# Patient Record
Sex: FEMALE | NOT HISPANIC OR LATINO | Employment: UNEMPLOYED
[De-identification: names, ages, dates, MRNs, and addresses within clinical notes are randomized per-mention and may not be internally consistent; named-entity substitution may affect disease eponyms.]

---

## 2017-08-19 ENCOUNTER — HEALTH MAINTENANCE LETTER (OUTPATIENT)
Age: 37
End: 2017-08-19

## 2019-12-03 ASSESSMENT — MIFFLIN-ST. JEOR: SCORE: 1902.59

## 2019-12-06 ENCOUNTER — ANESTHESIA - HEALTHEAST (OUTPATIENT)
Dept: SURGERY | Facility: AMBULATORY SURGERY CENTER | Age: 39
End: 2019-12-06

## 2019-12-09 ENCOUNTER — SURGERY - HEALTHEAST (OUTPATIENT)
Dept: SURGERY | Facility: AMBULATORY SURGERY CENTER | Age: 39
End: 2019-12-09

## 2019-12-09 ASSESSMENT — MIFFLIN-ST. JEOR: SCORE: 1902.59

## 2020-03-12 ASSESSMENT — MIFFLIN-ST. JEOR: SCORE: 1830.02

## 2020-03-13 ENCOUNTER — ANESTHESIA - HEALTHEAST (OUTPATIENT)
Dept: SURGERY | Facility: AMBULATORY SURGERY CENTER | Age: 40
End: 2020-03-13

## 2020-03-16 ENCOUNTER — SURGERY - HEALTHEAST (OUTPATIENT)
Dept: SURGERY | Facility: AMBULATORY SURGERY CENTER | Age: 40
End: 2020-03-16

## 2020-03-16 ASSESSMENT — MIFFLIN-ST. JEOR: SCORE: 1831.16

## 2021-06-03 VITALS — WEIGHT: 265 LBS | BODY MASS INDEX: 40.16 KG/M2 | HEIGHT: 68 IN

## 2021-06-04 VITALS — WEIGHT: 251 LBS | HEIGHT: 67 IN | BODY MASS INDEX: 39.39 KG/M2

## 2021-06-04 NOTE — ANESTHESIA PREPROCEDURE EVALUATION
Anesthesia Evaluation      Patient summary reviewed   History of anesthetic complications (Delayed emergence)     Airway   Mallampati: II  Neck ROM: full   Pulmonary     breath sounds clear to auscultation  (+) asthma   well controlled,                          Cardiovascular - negative ROS  Rhythm: regular  Rate: normal,         Neuro/Psych      Comments: Idiopathic hypersomnia    Endo/Other    (+) obesity (Morbid obesity, BMI >40),      GI/Hepatic/Renal - negative ROS           Dental - normal exam                        Anesthesia Plan  Planned anesthetic: general endotracheal    ASA 3   Induction: intravenous   Anesthetic plan and risks discussed with: patient and spouse  Anesthesia plan special considerations: antiemetics,   Post-op plan: routine recovery

## 2021-06-04 NOTE — ANESTHESIA CARE TRANSFER NOTE
Last vitals:   Vitals:    12/09/19 0949   BP: 133/63   Pulse: 91   Resp: 16   Temp: 36.3  C (97.3  F)   SpO2: 100%     Patient's level of consciousness is drowsy  Spontaneous respirations: yes  Maintains airway independently: yes  Dentition unchanged: yes  Oropharynx: oropharynx clear of all foreign objects    QCDR Measures:  ASA# 20 - Surgical Safety Checklist: WHO surgical safety checklist completed prior to induction    PQRS# 430 - Adult PONV Prevention: 4558F - Pt received => 2 anti-emetic agents (different classes) preop & intraop  ASA# 8 - Peds PONV Prevention: NA - Not pediatric patient, not GA or 2 or more risk factors NOT present  PQRS# 424 - Rebecca-op Temp Management: 4559F - At least one body temp DOCUMENTED => 35.5C or 95.9F within required timeframe  PQRS# 426 - PACU Transfer Protocol: - Transfer of care checklist used  ASA# 14 - Acute Post-op Pain: ASA14B - Patient did NOT experience pain >= 7 out of 10     Volatile agents turned off, muscle relaxant reversed, 4/4 twitches with sustained tetany. Pt breathing spontaneously with adequate tidal volumes, following commands, gently suctioned oropharynx, extubated without issue. 10LPM O2 applied via face mask.Transported by CRNA to recovery.

## 2021-06-04 NOTE — ANESTHESIA POSTPROCEDURE EVALUATION
Patient: Melissa Jay  BILATERAL ARM LIPOSUCTION  Anesthesia type: general    Patient location: Phase II Recovery  Last vitals:   Vitals Value Taken Time   /65 12/9/2019 10:30 AM   Temp 36.7  C (98.1  F) 12/9/2019 10:23 AM   Pulse 79 12/9/2019 10:33 AM   Resp 16 12/9/2019 10:23 AM   SpO2 94 % 12/9/2019 10:33 AM   Vitals shown include unvalidated device data.  Post vital signs: stable  Level of consciousness: awake and responds to simple questions  Post-anesthesia pain: pain controlled  Post-anesthesia nausea and vomiting: no  Pulmonary: unassisted, return to baseline  Cardiovascular: stable and blood pressure at baseline  Hydration: adequate  Anesthetic events: no    QCDR Measures:  ASA# 11 - Rebecca-op Cardiac Arrest: ASA11B - Patient did NOT experience unanticipated cardiac arrest  ASA# 12 - Rebecca-op Mortality Rate: ASA12B - Patient did NOT die  ASA# 13 - PACU Re-Intubation Rate: ASA13B - Patient did NOT require a new airway mgmt  ASA# 10 - Composite Anes Safety: ASA10A - No serious adverse event    Additional Notes:

## 2021-06-06 NOTE — ANESTHESIA CARE TRANSFER NOTE
Last vitals:   Vitals:    03/16/20 1352   BP: 122/76   Pulse: 93   Resp: 16   Temp: 36.1  C (96.9  F)   SpO2: 100%     Patient's level of consciousness is drowsy  Spontaneous respirations: yes  Maintains airway independently: yes  Dentition unchanged: yes  Oropharynx: oropharynx clear of all foreign objects    QCDR Measures:  ASA# 20 - Surgical Safety Checklist: WHO surgical safety checklist completed prior to induction    PQRS# 430 - Adult PONV Prevention: 4558F - Pt received => 2 anti-emetic agents (different classes) preop & intraop  ASA# 8 - Peds PONV Prevention: NA - Not pediatric patient, not GA or 2 or more risk factors NOT present  PQRS# 424 - Rebecca-op Temp Management: 4559F - At least one body temp DOCUMENTED => 35.5C or 95.9F within required timeframe  PQRS# 426 - PACU Transfer Protocol: - Transfer of care checklist used  ASA# 14 - Acute Post-op Pain: ASA14B - Patient did NOT experience pain >= 7 out of 10

## 2021-06-06 NOTE — ANESTHESIA POSTPROCEDURE EVALUATION
Patient: Melissa Jay  Procedure(s):  ABDOMINOPLASTY, BILATERAL BREAST REDUCTION  BILATERAL BREAST REDUCTION (Bilateral)  Anesthesia type: general    Patient location: PACU  Last vitals:   Vitals Value Taken Time   /68 3/16/2020  2:15 PM   Temp 36.2  C (97.1  F) 3/16/2020  2:01 PM   Pulse 72 3/16/2020  2:25 PM   Resp 16 3/16/2020  2:15 PM   SpO2 100 % 3/16/2020  2:25 PM   Vitals shown include unvalidated device data.  Post vital signs: stable  Level of consciousness: awake and responds to simple questions  Post-anesthesia pain: pain controlled  Post-anesthesia nausea and vomiting: no  Pulmonary: unassisted, return to baseline  Cardiovascular: stable and blood pressure at baseline  Hydration: adequate  Anesthetic events: no    QCDR Measures:  ASA# 11 - Rebecca-op Cardiac Arrest: ASA11B - Patient did NOT experience unanticipated cardiac arrest  ASA# 12 - Rebecca-op Mortality Rate: ASA12B - Patient did NOT die  ASA# 13 - PACU Re-Intubation Rate: ASA13B - Patient did NOT require a new airway mgmt  ASA# 10 - Composite Anes Safety: ASA10A - No serious adverse event    Additional Notes:

## 2021-06-06 NOTE — ANESTHESIA PREPROCEDURE EVALUATION
Anesthesia Evaluation        Airway   Mallampati: II  Neck ROM: full   Pulmonary - normal exam   (+) asthma                           Cardiovascular - negative ROS and normal exam   Neuro/Psych - negative ROS     Endo/Other    (+) obesity,      GI/Hepatic/Renal - negative ROS           Dental - normal exam                        Anesthesia Plan  Planned anesthetic: general endotracheal    ASA 2   Induction: intravenous   Anesthetic plan and risks discussed with: patient    Post-op plan: routine recovery

## 2023-10-17 ENCOUNTER — MEDICAL CORRESPONDENCE (OUTPATIENT)
Dept: HEALTH INFORMATION MANAGEMENT | Facility: CLINIC | Age: 43
End: 2023-10-17
Payer: COMMERCIAL

## 2023-12-08 ENCOUNTER — OFFICE VISIT (OUTPATIENT)
Dept: CARDIOLOGY | Facility: CLINIC | Age: 43
End: 2023-12-08
Payer: COMMERCIAL

## 2023-12-08 VITALS
RESPIRATION RATE: 16 BRPM | BODY MASS INDEX: 44.57 KG/M2 | HEIGHT: 67 IN | WEIGHT: 284 LBS | DIASTOLIC BLOOD PRESSURE: 90 MMHG | HEART RATE: 72 BPM | SYSTOLIC BLOOD PRESSURE: 128 MMHG | OXYGEN SATURATION: 98 %

## 2023-12-08 DIAGNOSIS — E66.01 CLASS 3 SEVERE OBESITY WITH BODY MASS INDEX (BMI) OF 40.0 TO 44.9 IN ADULT, UNSPECIFIED OBESITY TYPE, UNSPECIFIED WHETHER SERIOUS COMORBIDITY PRESENT (H): ICD-10-CM

## 2023-12-08 DIAGNOSIS — E66.813 CLASS 3 SEVERE OBESITY WITH BODY MASS INDEX (BMI) OF 40.0 TO 44.9 IN ADULT, UNSPECIFIED OBESITY TYPE, UNSPECIFIED WHETHER SERIOUS COMORBIDITY PRESENT (H): ICD-10-CM

## 2023-12-08 DIAGNOSIS — R06.09 DYSPNEA ON EXERTION: Primary | ICD-10-CM

## 2023-12-08 PROCEDURE — 99204 OFFICE O/P NEW MOD 45 MIN: CPT | Performed by: INTERNAL MEDICINE

## 2023-12-08 RX ORDER — METHOCARBAMOL 500 MG/1
TABLET, FILM COATED ORAL
COMMUNITY
Start: 2023-12-07

## 2023-12-08 RX ORDER — HYDROCODONE BITARTRATE AND ACETAMINOPHEN 5; 325 MG/1; MG/1
TABLET ORAL
COMMUNITY
Start: 2023-11-15 | End: 2024-03-04

## 2023-12-08 NOTE — PATIENT INSTRUCTIONS
Melissa Jay,    It was a pleasure to see you today at the [facility].     My recommendations after this visit include:    Stress and echo to check your heart for any structural abnormalities  Weight loss referral  Recumbent exercise      SEKOU Lanier MD, FACC, [facility]

## 2023-12-08 NOTE — PROGRESS NOTES
Thank you, Dr. Banks ref. provider found, for asking [facility] to evaluate Ms. Melissa Jay.      Assessment/Recommendations   Assessment:    History of syncope, presumably vasovagal  Elevated heart rate at rest possibly inappropriate sinus tachycardia/POTS spectrum on the top of deconditioning, severe obesity etc.      Plan:  Stress test and echo to rule out structural heart abnormalities  Weight loss referral  We discussed treatment of inappropriate sinus tachycardia, vasovagal syncope.  Recommended liberal salt intake.  She will benefit from recumbent exercise and significant weight loss  We could consider low-dose beta-blocker if palpitations were primary complaint.    She will follow-up with her neurologist.  She may ask him about neurological testing for autonomic dysfunction.  I do not foresee great benefit of performing tilt table test for her at this point.       History of Present Illness    Ms. Melissa Jay is a 43 year old female who comes in for cardiac evaluation.  She reports that over the years she has been getting progressively more fatigued and lightheaded.  She has been noticing elevated heart rate with certain activities.  Her heart rate does not go back to normal as quickly as it should.  She reports that she had a single episode of syncope when she was a teenager.  On several occasion she came close to passing about but never completely fainted.  She denies any chest pains.  She does get short of breath with certain activities.  She had normal EKG in 2018 in the recent event monitor.  She has never had any known valvular heart disease cardiomyopathy or coronary artery disease.  She was tested for sleep apnea and was told to have none.    ECG: Personally reviewed.  2018 normal sinus rhythm within normal limits.    Event monitor: 2023  No tachyarrhythmias no bradyarrhythmias  Sinus tachycardia noted     Physical Examination Review of Systems   BP (!)  "128/90 (BP Location: Left arm, Patient Position: Sitting, Cuff Size: Adult Large)   Pulse 72   Resp 16   Ht 1.702 m (5' 7\")   Wt 128.8 kg (284 lb)   SpO2 98%   BMI 44.48 kg/m    Body mass index is 44.48 kg/m .  Wt Readings from Last 3 Encounters:   12/08/23 128.8 kg (284 lb)   03/16/20 113.9 kg (251 lb)   12/09/19 120.2 kg (265 lb)     General Appearance:   Alert, cooperative, no distress, appears stated age   Head/ENT: Normocephalic, without obvious abnormality. Membranes moist      EYES:  no scleral icterus, normal conjunctivae   Neck: Supple, symmetrical, trachea midline, no adenopathy, thyroid: not enlarged, symmetric, no carotid bruit or JVD   Chest/Lungs:   Lungs are clear to auscultation, respirations unlabored. No tenderness or deformity    Cardiovascular:   Regular rhythm, S1, S2 normal, no murmur, rub or gallop.   Abdomen:  Soft, non-tender, bowel sounds active all four quadrants,  no masses, no organomegaly   Extremities: no cyanosis or clubbing. No edema   Skin: Skin color, texture, turgor normal, no rashes or lesions.    Psychiatric: Normal affect, calm   Neurologic: Alert and oriented x 3, moving all four extremities.     Enc Vitals  BP: (!) 128/90  Pulse: 72  Resp: 16  SpO2: 98 %  Weight: 128.8 kg (284 lb)  Height: 170.2 cm (5' 7\")                                          Lab Results    Chemistry/lipid CBC Cardiac Enzymes/BNP/TSH/INR   No results for input(s): \"CHOL\", \"HDL\", \"LDL\", \"TRIG\", \"CHOLHDLRATIO\" in the last 78464 hours.  No results for input(s): \"LDL\" in the last 79566 hours.  No results for input(s): \"NA\", \"POTASSIUM\", \"CHLORIDE\", \"CO2\", \"GLC\", \"BUN\", \"CR\", \"GFRESTIMATED\", \"HCA\" in the last 05247 hours.    Invalid input(s): \"GRFESTBLACK\"  No results for input(s): \"CR\" in the last 63278 hours.  No results for input(s): \"A1C\" in the last 46438 hours.       No results for input(s): \"WBC\", \"HGB\", \"HCT\", \"MCV\", \"PLT\" in the last 73152 hours.  No results for input(s): \"HGB\" in the last 39293 " "hours. No results for input(s): \"TROPONINI\" in the last 60808 hours.  No results for input(s): \"BNP\", \"NTBNPI\", \"NTBNP\" in the last 65640 hours.  No results for input(s): \"TSH\" in the last 46921 hours.  No results for input(s): \"INR\" in the last 68738 hours.     Medical History  Surgical History Family History Social History   Past Medical History:   Diagnosis Date    Anxiety     Asthma     Benign neoplasm of other specified sites of skin     Depressive disorder, not elsewhere classified     Idiopathic hypersomnia     Mild intermittent asthma     Obesity, unspecified     RW ALLERGIES/IMMUNOLOGY (ABSTRACTED)      Past Surgical History:   Procedure Laterality Date    ABDOMEN SURGERY      ABDOMINOPLASTY N/A 3/16/2020    Procedure: ABDOMINOPLASTY, BILATERAL BREAST REDUCTION;  Surgeon: Jayla Maciel MD;  Location: [facility];  Service: Plastics    C ANALGESIA,EPIDURAL,LABOR &   ,    C/SECTION, LOW TRANSVERSE  --    , Low Transverse     SECTION      x4    COLONOSCOPY      COSMETIC SURGERY      HC REDUCTION OF LARGE BREAST Bilateral 3/16/2020    Procedure: BILATERAL BREAST REDUCTION;  Surgeon: Jayla Maciel MD;  Location: [facility];  Service: Plastics    LIPOSUCTION (LOCATION) N/A 2019    Procedure: BILATERAL ARM LIPOSUCTION;  Surgeon: Jayla Maciel MD;  Location: [facility];  Service: Plastics     No premature CAD, SCD,cardiomyopathy   Social History     Socioeconomic History    Marital status:      Spouse name: Juan    Number of children: 2    Years of education: 14    Highest education level: Not on file   Occupational History     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   Tobacco Use    Smoking status: Never    Smokeless tobacco: Never   Substance and Sexual Activity    Alcohol use: Not Currently    Drug use: Not Currently    Sexual activity: Yes     Partners: Male     Comment: Mirena   Other Topics Concern     Service No    Blood " Transfusions No    Caffeine Concern No    Occupational Exposure No    Hobby Hazards No    Sleep Concern No    Stress Concern Yes    Weight Concern Yes     Comment: has lost around 55 lbs    Special Diet Yes     Comment: low carb diet    Back Care Yes     Comment: low back degenerative changes    Exercise Yes     Comment: walking daily, treadmill or outside    Bike Helmet No    Seat Belt Yes    Self-Exams No     Comment: encouraged    Parent/sibling w/ CABG, MI or angioplasty before 65F 55M? No   Social History Narrative    Not on file     Social Determinants of Health     Financial Resource Strain: Not on file   Food Insecurity: Not on file   Transportation Needs: Not on file   Physical Activity: Not on file   Stress: Not on file   Social Connections: Not on file   Interpersonal Safety: Not on file   Housing Stability: Not on file         Medications  Allergies   Current Outpatient Medications   Medication Sig Dispense Refill    ALBUTEROL SULFATE 0.083 % IN NEBU ONE NEBULIZATION 4 TIMES DAILY AS NEEDED 1 BOX 1 YEAR    HYDROcodone-acetaminophen (NORCO) 5-325 MG tablet       methocarbamol (ROBAXIN) 500 MG tablet       SUDAFED 30 MG OR TABS 1 TABLET ORALLY 4 TIMES DAILY AS NEEDED FOR ALLERGIES          Allergies   Allergen Reactions    Amoxicillin Hives    Penicillin G Hives and Rash    Sulfa Antibiotics Hives and Rash

## 2023-12-08 NOTE — LETTER
12/8/2023    Leticia Villanueva MD  [facility] [address]  [facility] [address]    RE: Melissa Jay       Dear Colleague,     I had the pleasure of seeing Melissa Jay in the [facility].        Thank you, Dr. Banks ref. provider found, for asking [facility] to evaluate Ms. Melissa Jay.      Assessment/Recommendations   Assessment:    History of syncope, presumably vasovagal  Elevated heart rate at rest possibly inappropriate sinus tachycardia/POTS spectrum on the top of deconditioning, severe obesity etc.      Plan:  Stress test and echo to rule out structural heart abnormalities  Weight loss referral  We discussed treatment of inappropriate sinus tachycardia, vasovagal syncope.  Recommended liberal salt intake.  She will benefit from recumbent exercise and significant weight loss  We could consider low-dose beta-blocker if palpitations were primary complaint.    She will follow-up with her neurologist.  She may ask him about neurological testing for autonomic dysfunction.  I do not foresee great benefit of performing tilt table test for her at this point.       History of Present Illness    Ms. Melissa Jay is a 43 year old female who comes in for cardiac evaluation.  She reports that over the years she has been getting progressively more fatigued and lightheaded.  She has been noticing elevated heart rate with certain activities.  Her heart rate does not go back to normal as quickly as it should.  She reports that she had a single episode of syncope when she was a teenager.  On several occasion she came close to passing about but never completely fainted.  She denies any chest pains.  She does get short of breath with certain activities.  She had normal EKG in 2018 in the recent event monitor.  She has never had any known valvular heart disease cardiomyopathy or coronary artery disease.  She was tested for sleep apnea  "and was told to have none.    ECG: Personally reviewed.  2018 normal sinus rhythm within normal limits.    Event monitor: 2023  No tachyarrhythmias no bradyarrhythmias  Sinus tachycardia noted     Physical Examination Review of Systems   BP (!) 128/90 (BP Location: Left arm, Patient Position: Sitting, Cuff Size: Adult Large)   Pulse 72   Resp 16   Ht 1.702 m (5' 7\")   Wt 128.8 kg (284 lb)   SpO2 98%   BMI 44.48 kg/m    Body mass index is 44.48 kg/m .  Wt Readings from Last 3 Encounters:   12/08/23 128.8 kg (284 lb)   03/16/20 113.9 kg (251 lb)   12/09/19 120.2 kg (265 lb)     General Appearance:   Alert, cooperative, no distress, appears stated age   Head/ENT: Normocephalic, without obvious abnormality. Membranes moist      EYES:  no scleral icterus, normal conjunctivae   Neck: Supple, symmetrical, trachea midline, no adenopathy, thyroid: not enlarged, symmetric, no carotid bruit or JVD   Chest/Lungs:   Lungs are clear to auscultation, respirations unlabored. No tenderness or deformity    Cardiovascular:   Regular rhythm, S1, S2 normal, no murmur, rub or gallop.   Abdomen:  Soft, non-tender, bowel sounds active all four quadrants,  no masses, no organomegaly   Extremities: no cyanosis or clubbing. No edema   Skin: Skin color, texture, turgor normal, no rashes or lesions.    Psychiatric: Normal affect, calm   Neurologic: Alert and oriented x 3, moving all four extremities.     Enc Vitals  BP: (!) 128/90  Pulse: 72  Resp: 16  SpO2: 98 %  Weight: 128.8 kg (284 lb)  Height: 170.2 cm (5' 7\")                                          Lab Results    Chemistry/lipid CBC Cardiac Enzymes/BNP/TSH/INR   No results for input(s): \"CHOL\", \"HDL\", \"LDL\", \"TRIG\", \"CHOLHDLRATIO\" in the last 14257 hours.  No results for input(s): \"LDL\" in the last 76464 hours.  No results for input(s): \"NA\", \"POTASSIUM\", \"CHLORIDE\", \"CO2\", \"GLC\", \"BUN\", \"CR\", \"GFRESTIMATED\", \"CHA\" in the last 67075 hours.    Invalid input(s): \"GRFESTBLACK\"  No " "results for input(s): \"CR\" in the last 93375 hours.  No results for input(s): \"A1C\" in the last 19705 hours.       No results for input(s): \"WBC\", \"HGB\", \"HCT\", \"MCV\", \"PLT\" in the last 22010 hours.  No results for input(s): \"HGB\" in the last 76913 hours. No results for input(s): \"TROPONINI\" in the last 68667 hours.  No results for input(s): \"BNP\", \"NTBNPI\", \"NTBNP\" in the last 68961 hours.  No results for input(s): \"TSH\" in the last 55162 hours.  No results for input(s): \"INR\" in the last 32918 hours.     Medical History  Surgical History Family History Social History   Past Medical History:   Diagnosis Date    Anxiety     Asthma     Benign neoplasm of other specified sites of skin     Depressive disorder, not elsewhere classified     Idiopathic hypersomnia     Mild intermittent asthma     Obesity, unspecified     RW ALLERGIES/IMMUNOLOGY (ABSTRACTED)      Past Surgical History:   Procedure Laterality Date    ABDOMEN SURGERY      ABDOMINOPLASTY N/A 3/16/2020    Procedure: ABDOMINOPLASTY, BILATERAL BREAST REDUCTION;  Surgeon: Jayla Maciel MD;  Location: [facility];  Service: Plastics    C ANALGESIA,EPIDURAL,LABOR &   ,    C/SECTION, LOW TRANSVERSE  -    , Low Transverse     SECTION      x4    COLONOSCOPY      COSMETIC SURGERY      HC REDUCTION OF LARGE BREAST Bilateral 3/16/2020    Procedure: BILATERAL BREAST REDUCTION;  Surgeon: Jayla Maciel MD;  Location: [facility];  Service: Plastics    LIPOSUCTION (LOCATION) N/A 2019    Procedure: BILATERAL ARM LIPOSUCTION;  Surgeon: Jayla Maciel MD;  Location: [facility];  Service: Plastics     No premature CAD, SCD,cardiomyopathy   Social History     Socioeconomic History    Marital status:      Spouse name: Juan    Number of children: 2    Years of education: 14    Highest education level: Not on file   Occupational History     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   Tobacco Use    Smoking " status: Never    Smokeless tobacco: Never   Substance and Sexual Activity    Alcohol use: Not Currently    Drug use: Not Currently    Sexual activity: Yes     Partners: Male     Comment: Mirena   Other Topics Concern     Service No    Blood Transfusions No    Caffeine Concern No    Occupational Exposure No    Hobby Hazards No    Sleep Concern No    Stress Concern Yes    Weight Concern Yes     Comment: has lost around 55 lbs    Special Diet Yes     Comment: low carb diet    Back Care Yes     Comment: low back degenerative changes    Exercise Yes     Comment: walking daily, treadmill or outside    Bike Helmet No    Seat Belt Yes    Self-Exams No     Comment: encouraged    Parent/sibling w/ CABG, MI or angioplasty before 65F 55M? No   Social History Narrative    Not on file     Social Determinants of Health     Financial Resource Strain: Not on file   Food Insecurity: Not on file   Transportation Needs: Not on file   Physical Activity: Not on file   Stress: Not on file   Social Connections: Not on file   Interpersonal Safety: Not on file   Housing Stability: Not on file         Medications  Allergies   Current Outpatient Medications   Medication Sig Dispense Refill    ALBUTEROL SULFATE 0.083 % IN NEBU ONE NEBULIZATION 4 TIMES DAILY AS NEEDED 1 BOX 1 YEAR    HYDROcodone-acetaminophen (NORCO) 5-325 MG tablet       methocarbamol (ROBAXIN) 500 MG tablet       SUDAFED 30 MG OR TABS 1 TABLET ORALLY 4 TIMES DAILY AS NEEDED FOR ALLERGIES          Allergies   Allergen Reactions    Amoxicillin Hives    Penicillin G Hives and Rash    Sulfa Antibiotics Hives and Rash                      Thank you for allowing me to participate in the care of your patient.      Sincerely,     Jony Lanier MD     [facility]  cc:   No referring provider defined for this encounter.

## 2024-01-05 ENCOUNTER — HOSPITAL ENCOUNTER (OUTPATIENT)
Dept: CARDIOLOGY | Facility: HOSPITAL | Age: 44
Discharge: HOME | End: 2024-01-05
Attending: INTERNAL MEDICINE
Payer: COMMERCIAL

## 2024-01-05 DIAGNOSIS — R06.09 DYSPNEA ON EXERTION: ICD-10-CM

## 2024-01-05 LAB
CV STRESS CURRENT BP HE: NORMAL
CV STRESS CURRENT HR HE: 101
CV STRESS CURRENT HR HE: 102
CV STRESS CURRENT HR HE: 102
CV STRESS CURRENT HR HE: 105
CV STRESS CURRENT HR HE: 111
CV STRESS CURRENT HR HE: 116
CV STRESS CURRENT HR HE: 119
CV STRESS CURRENT HR HE: 119
CV STRESS CURRENT HR HE: 128
CV STRESS CURRENT HR HE: 134
CV STRESS CURRENT HR HE: 134
CV STRESS CURRENT HR HE: 144
CV STRESS CURRENT HR HE: 152
CV STRESS CURRENT HR HE: 153
CV STRESS CURRENT HR HE: 156
CV STRESS CURRENT HR HE: 156
CV STRESS CURRENT HR HE: 157
CV STRESS CURRENT HR HE: 165
CV STRESS CURRENT HR HE: 166
CV STRESS CURRENT HR HE: 93
CV STRESS CURRENT HR HE: 93
CV STRESS CURRENT HR HE: 94
CV STRESS CURRENT HR HE: 95
CV STRESS DEVIATION TIME HE: NORMAL
CV STRESS ECHO PERCENT HR HE: NORMAL
CV STRESS EXERCISE STAGE HE: NORMAL
CV STRESS EXERCISE STAGE REACHED HE: NORMAL
CV STRESS FINAL RESTING BP HE: NORMAL
CV STRESS FINAL RESTING HR HE: 93
CV STRESS MAX HR HE: 166
CV STRESS MAX TREADMILL GRADE HE: 14
CV STRESS MAX TREADMILL SPEED HE: 3.4
CV STRESS PEAK DIA BP HE: NORMAL
CV STRESS PEAK SYS BP HE: NORMAL
CV STRESS PHASE HE: NORMAL
CV STRESS PROTOCOL HE: NORMAL
CV STRESS REASON STOPPED HE: NORMAL
CV STRESS RESTING PT POSITION HE: NORMAL
CV STRESS RESTING PT POSITION HE: NORMAL
CV STRESS ST DEVIATION AMOUNT HE: NORMAL
CV STRESS ST DEVIATION ELEVATION HE: NORMAL
CV STRESS ST EVELATION AMOUNT HE: NORMAL
CV STRESS SYMPTOMS HE: NORMAL
CV STRESS TEST TYPE HE: NORMAL
CV STRESS TOTAL STAGE TIME MIN 1 HE: NORMAL
STRESS ECHO BASELINE DIASTOLIC HE: 92
STRESS ECHO BASELINE HR: 92
STRESS ECHO BASELINE SYSTOLIC BP: 136
STRESS ECHO LAST STRESS DIASTOLIC BP: 80
STRESS ECHO LAST STRESS HR: 165
STRESS ECHO LAST STRESS SYSTOLIC BP: 174
STRESS ECHO POST ESTIMATED WORKLOAD: 7.9
STRESS ECHO POST EXERCISE DUR MIN: 6
STRESS ECHO POST EXERCISE DUR SEC: 30
STRESS ECHO TARGET HR: 150

## 2024-01-05 PROCEDURE — 93306 TTE W/DOPPLER COMPLETE: CPT

## 2024-01-05 PROCEDURE — 93018 CV STRESS TEST I&R ONLY: CPT | Performed by: INTERNAL MEDICINE

## 2024-01-05 PROCEDURE — 93016 CV STRESS TEST SUPVJ ONLY: CPT | Performed by: INTERNAL MEDICINE

## 2024-01-05 PROCEDURE — 93306 TTE W/DOPPLER COMPLETE: CPT | Mod: 26 | Performed by: INTERNAL MEDICINE

## 2024-01-05 PROCEDURE — 93017 CV STRESS TEST TRACING ONLY: CPT

## 2024-02-29 ENCOUNTER — TELEPHONE (OUTPATIENT)
Dept: CARDIOLOGY | Facility: CLINIC | Age: 44
End: 2024-02-29
Payer: COMMERCIAL

## 2024-02-29 NOTE — TELEPHONE ENCOUNTER
Called pt - gave her the phone number for weight management service.  Pt verbalized understanding and had no questions.  -[facility]    [phone]

## 2024-02-29 NOTE — TELEPHONE ENCOUNTER
[facility] Call Center    Phone Message    May a detailed message be left on voicemail: yes     Reason for Call: Other: Melissa called because at her last visit with Dr. Lanier, they spoke about a weight loss clinic or referral and Melissa lost the phone number for the recommended clinic or facility. Please reach out to Melissa to discuss. Thank you!     Action Taken: Other: Cardiology    Travel Screening: Not Applicable    Thank you!  [facility]

## 2024-03-01 ENCOUNTER — TELEPHONE (OUTPATIENT)
Dept: CARDIOLOGY | Facility: CLINIC | Age: 44
End: 2024-03-01
Payer: COMMERCIAL

## 2024-03-01 NOTE — TELEPHONE ENCOUNTER
[facility] Call Center    Phone Message    May a detailed message be left on voicemail: yes     Reason for Call: Other: Patient called and will like to schedule a tilt table test in [facility]. Please call patient back to further discuss and coordinate.     Action Taken: Other: Cardiology     Travel Screening: Not Applicable    Thank you!  [facility]

## 2024-03-03 ENCOUNTER — HEALTH MAINTENANCE LETTER (OUTPATIENT)
Age: 44
End: 2024-03-03

## 2024-03-04 ENCOUNTER — LAB (OUTPATIENT)
Dept: LAB | Facility: CLINIC | Age: 44
End: 2024-03-04
Payer: COMMERCIAL

## 2024-03-04 ENCOUNTER — TELEPHONE (OUTPATIENT)
Dept: CARDIOLOGY | Facility: CLINIC | Age: 44
End: 2024-03-04

## 2024-03-04 ENCOUNTER — OFFICE VISIT (OUTPATIENT)
Dept: SURGERY | Facility: CLINIC | Age: 44
End: 2024-03-04
Attending: INTERNAL MEDICINE
Payer: COMMERCIAL

## 2024-03-04 ENCOUNTER — TELEPHONE (OUTPATIENT)
Dept: SURGERY | Facility: CLINIC | Age: 44
End: 2024-03-04

## 2024-03-04 VITALS
WEIGHT: 279 LBS | SYSTOLIC BLOOD PRESSURE: 130 MMHG | DIASTOLIC BLOOD PRESSURE: 78 MMHG | HEIGHT: 67 IN | BODY MASS INDEX: 43.79 KG/M2

## 2024-03-04 DIAGNOSIS — E66.813 CLASS 3 SEVERE OBESITY DUE TO EXCESS CALORIES WITH BODY MASS INDEX (BMI) OF 40.0 TO 44.9 IN ADULT, UNSPECIFIED WHETHER SERIOUS COMORBIDITY PRESENT (H): Primary | ICD-10-CM

## 2024-03-04 DIAGNOSIS — G89.29 CHRONIC BACK PAIN, UNSPECIFIED BACK LOCATION, UNSPECIFIED BACK PAIN LATERALITY: ICD-10-CM

## 2024-03-04 DIAGNOSIS — M54.9 CHRONIC BACK PAIN, UNSPECIFIED BACK LOCATION, UNSPECIFIED BACK PAIN LATERALITY: ICD-10-CM

## 2024-03-04 DIAGNOSIS — G47.10 HYPERSOMNIA: ICD-10-CM

## 2024-03-04 DIAGNOSIS — E66.01 CLASS 3 SEVERE OBESITY DUE TO EXCESS CALORIES WITH BODY MASS INDEX (BMI) OF 40.0 TO 44.9 IN ADULT, UNSPECIFIED WHETHER SERIOUS COMORBIDITY PRESENT (H): ICD-10-CM

## 2024-03-04 DIAGNOSIS — E66.813 CLASS 3 SEVERE OBESITY DUE TO EXCESS CALORIES WITH BODY MASS INDEX (BMI) OF 40.0 TO 44.9 IN ADULT, UNSPECIFIED WHETHER SERIOUS COMORBIDITY PRESENT (H): ICD-10-CM

## 2024-03-04 DIAGNOSIS — E66.01 CLASS 3 SEVERE OBESITY DUE TO EXCESS CALORIES WITH BODY MASS INDEX (BMI) OF 40.0 TO 44.9 IN ADULT, UNSPECIFIED WHETHER SERIOUS COMORBIDITY PRESENT (H): Primary | ICD-10-CM

## 2024-03-04 LAB
ALBUMIN SERPL BCG-MCNC: 4.5 G/DL (ref 3.5–5.2)
ALP SERPL-CCNC: 100 U/L (ref 40–150)
ALT SERPL W P-5'-P-CCNC: 11 U/L (ref 0–50)
ANION GAP SERPL CALCULATED.3IONS-SCNC: 13 MMOL/L (ref 7–15)
AST SERPL W P-5'-P-CCNC: 21 U/L (ref 0–45)
BILIRUB SERPL-MCNC: 0.4 MG/DL
BUN SERPL-MCNC: 7.4 MG/DL (ref 6–20)
CALCIUM SERPL-MCNC: 9.3 MG/DL (ref 8.6–10)
CHLORIDE SERPL-SCNC: 104 MMOL/L (ref 98–107)
CREAT SERPL-MCNC: 0.73 MG/DL (ref 0.51–0.95)
DEPRECATED HCO3 PLAS-SCNC: 24 MMOL/L (ref 22–29)
EGFRCR SERPLBLD CKD-EPI 2021: >90 ML/MIN/1.73M2
ERYTHROCYTE [DISTWIDTH] IN BLOOD BY AUTOMATED COUNT: 12.8 % (ref 10–15)
GLUCOSE SERPL-MCNC: 90 MG/DL (ref 70–99)
HBA1C MFR BLD: 5.4 % (ref 0–5.6)
HCT VFR BLD AUTO: 40.4 % (ref 35–47)
HGB BLD-MCNC: 13.7 G/DL (ref 11.7–15.7)
MCH RBC QN AUTO: 29 PG (ref 26.5–33)
MCHC RBC AUTO-ENTMCNC: 33.9 G/DL (ref 31.5–36.5)
MCV RBC AUTO: 86 FL (ref 78–100)
PLATELET # BLD AUTO: 292 10E3/UL (ref 150–450)
POTASSIUM SERPL-SCNC: 3.8 MMOL/L (ref 3.4–5.3)
PROT SERPL-MCNC: 7 G/DL (ref 6.4–8.3)
PTH-INTACT SERPL-MCNC: 38 PG/ML (ref 15–65)
RBC # BLD AUTO: 4.72 10E6/UL (ref 3.8–5.2)
SODIUM SERPL-SCNC: 141 MMOL/L (ref 135–145)
TSH SERPL DL<=0.005 MIU/L-ACNC: 1.07 UIU/ML (ref 0.3–4.2)
VIT B12 SERPL-MCNC: 447 PG/ML (ref 232–1245)
VIT D+METAB SERPL-MCNC: 25 NG/ML (ref 20–50)
WBC # BLD AUTO: 5.4 10E3/UL (ref 4–11)

## 2024-03-04 PROCEDURE — 83970 ASSAY OF PARATHORMONE: CPT

## 2024-03-04 PROCEDURE — 85027 COMPLETE CBC AUTOMATED: CPT

## 2024-03-04 PROCEDURE — 83036 HEMOGLOBIN GLYCOSYLATED A1C: CPT

## 2024-03-04 PROCEDURE — 80053 COMPREHEN METABOLIC PANEL: CPT

## 2024-03-04 PROCEDURE — 36415 COLL VENOUS BLD VENIPUNCTURE: CPT

## 2024-03-04 PROCEDURE — 99205 OFFICE O/P NEW HI 60 MIN: CPT | Performed by: EMERGENCY MEDICINE

## 2024-03-04 PROCEDURE — 82306 VITAMIN D 25 HYDROXY: CPT

## 2024-03-04 PROCEDURE — 82607 VITAMIN B-12: CPT

## 2024-03-04 PROCEDURE — 84443 ASSAY THYROID STIM HORMONE: CPT

## 2024-03-04 RX ORDER — CELECOXIB 100 MG/1
CAPSULE ORAL
COMMUNITY
Start: 2024-02-16

## 2024-03-04 RX ORDER — DEXTROAMPHETAMINE SACCHARATE, AMPHETAMINE ASPARTATE, DEXTROAMPHETAMINE SULFATE AND AMPHETAMINE SULFATE 2.5; 2.5; 2.5; 2.5 MG/1; MG/1; MG/1; MG/1
10 TABLET ORAL 2 TIMES DAILY
COMMUNITY

## 2024-03-04 ASSESSMENT — SLEEP AND FATIGUE QUESTIONNAIRES
HOW LIKELY ARE YOU TO NOD OFF OR FALL ASLEEP WHILE SITTING AND TALKING TO SOMEONE: WOULD NEVER DOZE
HOW LIKELY ARE YOU TO NOD OFF OR FALL ASLEEP WHILE SITTING QUIETLY AFTER LUNCH WITHOUT ALCOHOL: WOULD NEVER DOZE
HOW LIKELY ARE YOU TO NOD OFF OR FALL ASLEEP WHILE SITTING AND READING: WOULD NEVER DOZE
HOW LIKELY ARE YOU TO NOD OFF OR FALL ASLEEP IN A CAR, WHILE STOPPED FOR A FEW MINUTES IN TRAFFIC: WOULD NEVER DOZE
HOW LIKELY ARE YOU TO NOD OFF OR FALL ASLEEP WHILE SITTING INACTIVE IN A PUBLIC PLACE: WOULD NEVER DOZE
HOW LIKELY ARE YOU TO NOD OFF OR FALL ASLEEP WHILE LYING DOWN TO REST IN THE AFTERNOON WHEN CIRCUMSTANCES PERMIT: HIGH CHANCE OF DOZING
HOW LIKELY ARE YOU TO NOD OFF OR FALL ASLEEP WHEN YOU ARE A PASSENGER IN A CAR FOR AN HOUR WITHOUT A BREAK: WOULD NEVER DOZE
HOW LIKELY ARE YOU TO NOD OFF OR FALL ASLEEP WHILE WATCHING TV: WOULD NEVER DOZE

## 2024-03-04 NOTE — TELEPHONE ENCOUNTER
Retail Pharmacy Prior Authorization Team   Phone: [phone]    PRIOR AUTHORIZATION DENIED    Medication: ZEPBOUND 2.5 MG/0.5ML SC SOAJ  Insurance Company: [employer] ([employer]) - Phone [phone] Fax [phone]  Denial Date: 3/4/2024  Denial Reason(s):       Appeal Information: IF PROVIDER WOULD LIKE TO APPEAL THIS DECISION PLEASE PROVIDE THE PA TEAM WITH A LETTER OF MEDICAL NECESSITY      Patient Notified: No

## 2024-03-04 NOTE — PATIENT INSTRUCTIONS
"Plan:  Welcome to your weight loss season. To start we'll aim for protein rich, vegetable rich and higher fiber carbohydrate diet to hit your 3230-0453 kdal/day with 80 grams of lean protein daily and  oz of abdi.     2. Start tracking intake to find your sweet spot based on how active you are.  Apps like Lose It, my net diary or paper journaling help greatly when combined with a daily morning weight after emptying your bladder.    3. Ramp up Zepbound: start 2.5mg/week for 4 weeks then 5mg/week for 4 weeks then 7.5mg/week we'll hold at for 3-4 months before assessing needs.  Stop Zepbound if any general anesthesia, 2 weeks prior would be ideal.     Stop zepbound if rash/throat swelling, severe abdominal or inability to eat enough (bare minimum of 1300kcal/day and 70g of lean protein daily).  You'll likely find that softer proteins work better.     4. Follow up with dietician.     5. Check labs.       What makes a person succeed with dramatic and sustained weight loss?    It's being at the right point in your life where you feel the need to lose the weight, not because anyone told you so but because of a voice inside of you that says, \"I am ready for this\".  You're now at a point where you may be feeling anxious, irritable and when you look in the mirror you do not recognize the person looking back.  Your healthy self is buried somewhere in that reflexion and you want to free it again.  This is the sort of motivation that leads to success, and it comes from you.    Because the only person that can lose that extra weight is you, I like my patients to focus on the mindset of being in Weight Loss Season.  This gives you permission to make the changes necessary to be consistent with the diet/activity and behavior changes that lead to successful, healthy weight loss.  Nearly any diet plan can work for weight loss, but keeping it healthy and nutrient based to prevent deficiencies/hair loss/fatigue or irritability " "is vital.  If you have a plan you want to try, we'll work with you to make sure no adjustments are needed to keep you healthy through your weight loss season and working with our Bariatric Dieticians you'll be given expert guidance to customize your diet plan to suit your particular needs. If you don't have your own ideas in mind, we are always happy to suggest well researched and validated plans that provide enough food to prevent hunger but still tap into your excess fat reserves and lose weight in a sustainable fashion.  There is great evidence that lean protein/healthy fat intake with good fiber intake while minimizing simple starches/carbs produces reliable/sustainable weight loss in most people. But some feel more connected to an intermittent fasting/fast mimicking or ketogenic diet.  These protocols can be hard for many to stick with and that's why we prefer the protein/healthy fat focused diets but if these alternative strategies appeal to you, we can work with you to optimize your knowledge and results with these tools.    Losing weight is a temporary commitment, but you need to be \"All In\" to have a good weight loss season.  To avoid frustration, you have to be willing to be on track 19 out of 20 days or even better than that. But, weight loss season is generally only 4-8 months in length. After that length of time, it can be hard to maintain a negative calorie balance and our brain, motivations and metabolism will usually bring you to a plateau that cannot be broken in this modern world where other commitments start to take priority. That's when we look to stabilize the weight loss you've achieved.  If you've reached your goal by that time, fantastic, and job well done.  If there is more to go, then after a few months of stabilization, we can usually attack that previous plateau and break into new territory.    Because of this time limitation, we want to really get to work right away and get into a " "sustainable routine ASAP.  One of the best predictors of how much weight you're going to lose throughout the season is how much you lose in the first 6 weeks, so prepare well and jump in with both feet.      Occasionally, people may feel like they cannot commit fully to the changes necessary and may want to change one thing at a time and \"get used to\" the idea of losing weight.  That is OK because that is where they are in their life, and they cannot fully commit for any number of reasons.  It's part of that internal motivation and they just haven't reached PRIORTY NUMBER ONE status yet. It's possible that what they need is more time to reach that point and I am always willing to work with people that want to \"dabble\", but understand, the amount of success obtained with half measures, is much less than half results. Behavior Change cannot occur until we prepare our minds, bodies and environment for what is to come, action!    As you go through your plan, look for things to keep your motivation rolling.  The most successful people have a goal or target/reward that they are working towards.  Having a reward that celebrates your new fitness, mobility and energy is the best sort because it will encourage you to do well with the weight maintenance phase and long term lifestyle changes that promotes keeping the weight off for the long term.  Usually, \"getting healthier\" or improving blood tests or losing weight so your clothes fit better is not as internally motivating as having a tangible reward.  A good weight loss season reward is one that isn't food based, is affordable, but something special:  Something you won't be getting/doing unless you achieve your goal.   It s important to keep to the rule of success:  in order to get the reward, your goal MUST be achieved. Write this reward down, where you can look at it daily and keep it in the front of your mind as you go through your weight loss season and it will help keep " you on track.    Tools that help change behavior are vital for success. The most studied and most supported tool for weight loss is nothing more than writing down your food and weight every day.  Every Day.  Accurately and completely.  When you commit to weight loss season, this information tells you whether you're getting ENOUGH food to fuel your weight loss properly as well as teaches you the interaction between different foods you eat and how your body responds with weight loss.  You'll see that sometimes after a heavy workout you don't see the scale move until 2-3 days later.  How saltier meals (chili for example) may make you retain water for 4-5 days before you see the weight come off, you'll get used to the mini-plateaus that develop after a good 3-8 lb drop in weight as well as how you break through if you keep working the diet as you should.    Weight loss is not a linear process, there are mini ups and downs.  Learning how your body loses weight and getting comfortable with that is very rewarding. The act of writing words on paper also solidifies your will power and commitment to the season of weight loss and that by itself changes your brain chemistry/appetite, motivation and prepares you for maximal success.     Behavior change is all about getting into a new routine.  The old habits and routine have to change because without changing the circumstances of how you gained your weight, it's unlikely you'll enjoy satisfying results. If you have snacking habits, like every time you walk through the kitchen you grab a little something, well, that habit has to change and be replaced by a new habit.  It can be something as simple as keeping a doodle pad on the counter that you make a few scribbles and then walk through the kitchen having not opened the cupboard, or starting with a glass of water and leaving the kitchen without anything else, or checking your food journal to see how many calories you have left  "for the day.  Boredom is the enemy as are the old habits. Break new ground and try to push those old habits into a deep hole.  There are apps/counseling options available that can help with some of the day to day urges/behaviors if you're struggling. One commercial product that does a good job is Noom.  Unfortunately, there is a subscription fee.    Finally, exercise always helps.  While not mandatory to lose weight, every little bit helps and exercise has so many other benefits that to not work it into your plan is to miss out on all the mood, sleep, stress and general health benefits that come from making yourself a little short of breath and sweaty at least 3-4 days out of the week.  The metabolism and calorie burn benefits aside, almost every chronic ailment in medicine gets better with proper, aerobic exercise.  Allow yourself to start slow and let your body prepare itself to accept harder training 4-6 weeks down the road, but start now and commit to a plan.  Whether you have the means to hire a , join a gym or just walk out your front door or go down to your basement for a video workout, get into a exercise routine and  after 3 weeks of at least 3 times a week exercise you should be at a point where you can slowly start ramping up 10% each week to our goal of at least 150-300minutes weekly of aerobic exercise and at least twice weekly resistance training/strenghtening with weights/bands or body weight exercises.     I am a big fan of modifying the free training plan, \"Couch to 5k\", for almost all of my patients. Just type it into [employer] or look it up on your smart phone [employer] store.  To modify the plan,  you can use the training plan for whatever aerobic activity you do (bike/treadmill/elliptical/rower/pool/etc). During the \"jog\" intervals, you just move a little faster or harder or increase the tension or incline.  You use those little intervals to switch up the workout and recruit more muscles and pump " "the blood a little more and then recover again in the \"walk\" intervals by slowing down, decreasing the incline or turning down the tension.  3-4 days a week is not that much to ask and the benefits are enormous.  Start slow and develop the base from which you can then build on and reduce the risk of injury.  It's much more important 2-4 months from now to be enjoying your exercise then it is to over exert yourself at the start and hurt yourself.  Starting slowly allows your body to accept the training better down the road when the exercise becomes crucial for weight maintenance.  Without exercise down the road during your maintenance phase, all this hard work you are about to put in can be undone. It usually takes about 100-300 calories a day of exercise to maintain a weight loss and our focus during weight loss season is to generate the routine/activities and hobbies that make that enjoyable/sustainable.    Thanks for taking this first and most important step in your weight loss season.  Commit to it and we will cheerlead you all the way to success.  When things get tough or off track we'll offer guidance and analysis and when you reach your goal we'll celebrate your success.  In the end, it is all about your success, your health and what you do with it.      Lars Morales MD  [facility]  [phone]      Example Meal Plan for a 3304-0277 Calorie Diet:    In order to fuel your weight loss properly and avoid hunger-induced overeating later in the day, for your height and weight, you will enjoy the most success by following the diet below or similar with adjustments based on your particular tastes and preferences.  Exercise may influence speed, amount of weight loss further.     I recommend getting into a meal routine and keeping it similar day to day in the beginning so you don t have to think too hard about what you re going to make/eat.  Keep snacks healthy, ideally containing " protein and some vegetables.  Non-processed food is preferable to packaged items.  Eat at least a few crunchy green vegetables if having a snack, which should be 2-3 hours after your mealtimes(prepare these ahead of time for ease of use).  Drink 64 oz -80 oz of water daily for most, some of you will need more and we'll discuss it at your visit if that is the case.      When changing our diet,  we can often mistake thirst for hunger or just have some distracted eating habits that we need to break free from ('bored/mindless eating', screen time,work, driving,etc).  A glass of water and reconsideration of our hunger is often all that is needed.  Having the urge is not the problem, but watching it pass by without acting on it is the goal.    If you re having hunger problems, add a protein drink/snack to your morning hours or afternoon snack with at least 20grams of protein and not too much sugar (under 10g).  A carton of higher protein/low sugar yogurt can work as well.  If the urge to snack is overwhelming and not satiated, try going for a 10 minute walk/exercise, come home and drink a glass of water and if still hungry, have a  calorie snack (handful of raw/sprouted nuts, veggies and string cheese, protein bar, etc).  Savor it.    It is better to have a large breakfast, a moderate lunch and a smaller dinner to fuel your day.  People lose 10-15% more weight during their weight loss season with this strategy. Optimizing your protein intake at each meal will further keep you more satisfied while eating less food overall.  Getting exercise in early has also been shown to offer the best results (before breakfast ideally but anytime is the right time to exercise if that is not an option for you).    To make sure you re getting adequate vitamins and minerals during weight loss, I recommend one complete multivitamin a day of your choice.  Consider a probiotic and taking some vitamin D 2000 IU daily.    Let supper be  your last meal of the day and ideally try to have at least 12 hours between supper and breakfast the next day to tap into some beneficial overnight fasting dynamics.  Midnight snacks need to go away. Water in the evening is fine, unsweetened, non caffeinated herbal tea is helpful as well.  Consolidating your meals within a 8-12 hour period of your day will help tap into these additional metabolic benefits and tends to keep your appetite up for breakfast, further helping to stay on track.  For most of my patients, I don't recommend an intermittent fasting style diet (many find it hard to fit in their lifestyle) but an overnight fast is very doable for most patients and helps regulate our hunger drives a little better.  This makes it very important to nail good intake at all three meals to feel satisfied/energized and still lose weight.      If evening snacking desires are high, consider a glass of fiber supplement for some additional fullness (metamucil or similar). Most of us don't get the 25-30 grams per day of fiber that promotes good gut health/satiety.  Benefiber, metamucil, citrucel are reasonable/affordable options for most people.  Inulin, chicory, psyllium husk are reasonable options but start slow and low in the dose to avoid gas/bloating until your gut gets acclimated (ramping up to 5-10 grams per day of supplemental fiber after 3-4 weeks if needed).      Example Meal Plan:  Breakfast: 450-475 Calories  1 egg cooked on low in olive oil:   calories.  5oz Greek Yogurt (Fage plain classic: ~150 eris)  Handful of Berries of your choice (about a calorie per berry or 20-40cal per handful)    cup(cooked) of  old fashioned oatmeal or 1/2 cup(cooked) steel cut oats. (150 eris)  Sprinkle amount of brown sugar and a pat of butter. (40 eris)  Glass of  Water  Black coffee or unsweetened Tea (0calories).      2-3 hours Later Snack: (195 calories).  Glass of water  One string Cheese (80 calories) or 4 oz creamed  cottage cheese (115 calories) with  Crunchy Celery sticks (less than 10 calories per large stalk) 2 stalks. (20 calories)    of a  Large Banana or   of a Large Apple (60 calories):  eat second half at lunch or afternoon snack.     Lunch:300 -350 calories   Chicken Breast  (baked/broiled/roasted/grilled)  4-6 oz.  (125-180 eris), BBQ sauce/hot sauce/mustard/seasoning is free. Just use a reasonable amount. Or a can of tuna with 1 tablespoon mayonnaise.  Salad: lettuce, any other veggies (cucumbers, green peppers/celery you like and a small drizzle of dressing to just flavor.  Go as big on the veggies as you like,  as they are practically calorie free.   A whole, 8 inch cucumber is 45 calories, a whole green pepper is 23 calories, a stalk of celery is 9 calories.  [place] Dressing is 60 calories per tablespoon..so moderate your desired dressing or do a drizzle of olive oil and splash of balsamic vinegar on top,  Total calories unlikely to be over 150 even with dressing.  Glass of Water.    Option for lunch is meal replacement protein drink/smoothie.  Need at least 20 grams of protein and eat the rest of your apple/banana from the morning snack.      Afternoon Snack: 150-200 calories   Cheese Stick or cottage cheese again  and a fresh fruit OR  Granola Bar (protein Bar acceptable if under 200 calories OR  Homemade smoothies:  8oz skim milk,  a handful of berries (fresh or frozen and a serving of protein powder such as BiPro or Ginny sWhey for example.  If you don't like dairy, make with 8oz water, one small banana, handful of berries and the protein powder, add any veggies you want as well:  roughly 200 calories.   Glass of Water    Dinner: 325 calories  4oz of fresh, Atlantic salmon.  Broiled (salt/pepper/dill) for about 8-8.5 minutes (200calories) or  4oz filet mignon steak or sirloin steak  Salad or vegetable sautéed lightly in olive oil or   Broccoli 1.5  cups chopped and steamed  or micro-waved in a little  "water (75 calories)  Glass of Water,    Cup of herbal tea (unsweetened, caffeine free)      Herbs and seasonings are encouraged to flavor your foods/vegetables.  Make your food delicious.      Tips for Success:  1.  Prepare proteins ahead of time (broil chicken breasts in bulk so you can grab and go), steel cut oats/lentils can be stored in casserole dish/bowl in the fridge for quick scoop in the morning and rewarm in microwave, make use of crock pot recipes (watch salt content).  Making meals that cover 3-4 future meals is an easy way to stay on track.  2.  Drink a 8-12 oz glass of water every 2-3 hours when awake.  We often mistake hunger for thirst, especially when losing weight.  3. Remember your Reward and Motivation when things get hard.  4.  Weigh yourself every morning and record, you'll stay on track better and learn how our biorhythms, diet and elimination patterns show up on the scale. Don't worry about 1 or 2 day patterns, but when on track you'll notice good trend downward of weight over 3-4 day segments.  Plateaus tend to resolve after 4-8 days in most cases if you stay consistent with your plan.  These are natural and part of weight loss, even if you're perfect with your plan execution.  5. Call if problems/concerns.  [employer] is a great tool to stay in touch and provide weekly outside accountability. Check in with questions or if you want to brag.  6.  Find a handful of meals/foods that keep you on track and feeling good and get into a routine that is sustainable for you.  It's OK to have a routine that works for you.  7.  Consider taking a complete multivitamin just to make sure all micronutrients are adequate during weight loss.  8. If losing hair/brittle nails it usually means you are not taking enough protein.  Minimum goal is 60 grams daily of protein for smaller women, 80 grams a day for men. Consider taking Biotin as supplement or a \"Hair and Nail\" multivitamin.      On-the-Go Breakfast " Ideas  As of 2015, the latest research shows what a huge impact eating breakfast has on losing weight and feeling your best. People lose more weight when they make breakfast their biggest meal of the day compared to Dinner, but even if you cannot go to that degree, getting a breakfast that has at least 20 grams of protein and even a moderate amount of fat is ideal for maintaining good energy through the day and limits overeating in the evening hours.  The following are some quick and easy suggestions for at least getting something of substance into your body in the morning.  Enjoy!    Eating breakfast within 90 minutes of waking up is an important part of taking care of your body on a restricted calorie diet plan.  After sleeping for hours, your body is in need of fuel.  An ideal breakfast is a combination of protein, whole-grain carbohydrates, or fruit.  Here s why:    -Protein digests very slowly in the body, helping you feel more satisfied.  -Whole grains provide dietary fiber, which also digests slowly and helps keep your gut clean.  -Fruit is a great source of vitamins, minerals, and fiber.     Each one of these breakfast combinations has between 200-300 calories and 15-20 grams of protein.  Feel free to mix and match!    Bone Broth (chicken bone broth or beef bone broth) is a great way to boost protein content. 8oz of bone broth will typically have 9-12grams of protein for 40kcal of energy.    Protein: Choose  -1/2 cup low-fat cottage cheese  -2 hard boiled eggs , or one cooked in olive oil (low/slow heat).  -1 low fat string cheese stick  -1 Tableson natural peanut butter  -[employer] vegetarian sausage jessica (found in freezer section)  -1 slice lowfat cheese  -6 oz 2% or lowfat Greek yogurt, such as Fage or Oikos.    PLUS    Whole Grains:  Choose   -1 whole wheat English muffin  -1 whole wheat nitin, half  -1/2 Fiber One frozen muffin, thawed  -1/2 Fiber One toaster pastry  -1 whole wheat bagel  thin  -1/2 cup Kashi cereal  -1 Kashi waffle (or other whole grain high-fiber waffle)  Aim for whole grain/sprouted breads with at least 3g of fiber/slice if having bread. [place] is one such brand.    OR    Fruit: Choose  -1/3 cup blueberries  -1/2 banana (or a plantain- similar to a banana, yet smaller)  -1/2 cup cantaloupe cubes  -1 small apple  -1 small orange  -1/2 cup strawberries  -handful raspberries/blackberries (each berry is about 1 calorie).    *Adapted from Diabetes Living, Fall 20    Ten Breakfasts Under 250 calories    Ideally, getting between 350-600 calories  (depending on starting height and weight)for breakfast is ideal for avoiding hunger later in the day, adjust/add to the following accordingly:    One- 250 calories, 8.5 g protein  1 slice whole-grain toast   1 Tbsp peanut butter    banana    Two- 250 calories, 8 g protein    cup nonfat/lowfat yogurt  1/3rd cup diced no-sugar peaches  1/3rd cup cereal (like Special K, Cheerios, or bran flakes)    Three- 250 calories, 25 g protein  1 egg scrambled with 1 oz skim milk    cup shredded cheddar    whole grain English muffin  1 oz [place] muñoz  1 tsp margarine spread    Four- 225 calories, 25 g protein  1/2 cup Kashi Go-Lean cereal    cup skim milk mixed with 1 scoop Bariatric Advantage protein powder    cup no-sugar diced pears    Five- 250 calories, 20 g protein    cup oatmeal prepared with skim milk, 1 scoop protein powder, and sugar-free maple syrup    Six- 200 calories, 5 g protein  1 whole grain waffle, toasted  1 tablespoon creamy peanut or almond butter    Seven-  250 calories, 19 g protein  Breakfast sandwich: 1 slice whole grain toast, cut in half.  Add 1 scrambled egg and one slice cheddar  cheese.    Eight-  250 calories, 15 g protein  2 eggs scrambled with 1/3 cup frozen spinach (heat before adding to eggs) and 2 tablespoons low fat cream cheese.    Nine-  150 calories, 15 g protein  2/3rd cup cottage cheese    cup  cantaloupe    Ten- 200 calories, 20 g protein  Fruit smoothie made with 4 oz. nonfat Greek yogurt,   cup berries, 1 scoop protein powder, and 4 oz skim milk.    Ten Lunches Under 250 Calories    Aim for lunch to be around 300-400 calories a day when trying to lose weight and get that protein in!    One- 200 calories, 11 g protein  1/3 cup tuna salad made with light conner on 1 slice whole grain bread  1 small peeled apple    Two- 250 calories, 16 g protein  1/3 cup lowfat cottage cheese    cup cooked green beans    small fruit cocktail (in natural juice)    Three- 200 calories, 11 g protein    grilled cheese sandwich on whole grain bread with lowfat cheese  2/3rd cup of tomato soup    Four- 250 calories, 22 g protein  Deli wrap: 1 oz sliced turkey, 1 oz sliced ham, 1 oz sliced chicken rolled up with 1 slice low-fat cheese  1 small orange    Five- 250 calories, 28 g protein  2/3rd cup chili with 1 oz shredded cheese  4 saltine crackers    Six- 250 calories, 22 g protein  1 cup fresh spinach with 2 oz chicken, 1/3rd cup mandarin oranges, and 2 tablespoons sliced almonds with 1 tablespoon  vinaigrette dressing    Seven- 200 calories, 11 g protein  1 Tbsp sugar-free preserves and 1 Tbsp peanut butter on 1 slice whole grain toast    cup nonfat/lowfat Greek yogurt    Eight- 250 calories, 18 g protein  1 small soft-shell chicken taco with 1 oz shredded cheese, lettuce, tomato, salsa, and 1 Tbsp light sour cream    cup black beans    Nine- 225 calories, 13 g protein  2 ounces baked chicken  1/4 cup mashed potatoes    cup green beans    Ten- 200 calories, 21 g protein  Deli nitin: 2 oz roast beef or other deli meat with 1 tsp Bertram mayonnaise and sliced tomato, onion, and lettuce  1/3rd cup cottage cheese      Ten Dinners Under 300 calories    If you're eating a large breakfast and medium lunch, keep dinner small.  300-400 calories is ideal for most people depending on their caloric needs.    One- 300 calories, 12 g  protein  1-inch thick slice of turkey meatloaf    cup baked butternut squash    Two- 200 calories, 9 g protein  Bread-less BLT: 3 slices turkey muñoz, sliced tomato, wrapped in a large lettuce leaf    cup peeled fruit    Three- 275 calories, 36 g protein  3 oz roasted chicken    cup cooked broccoli    cup shredded cheddar cheese    cup unsweetened applesauce    Four- 200 calories, 25 g protein  3 oz baked tilapia  1/3rd cup cooked carrots    cup yogurt    Five- 250 calories, 20 g protein  Grilled ham  n  Swiss: spread 2 tsp ghee or butter on 1 slice of whole grain bread.  Cut bread in half, layer 2 oz deli ham with 1 piece of Swiss cheese and grill until cheese is melted.    cup cooked vegetables    Six- 250 calories, 18 g protein  Vegetarian cheeseburger: 1 Boca cheeseburger topped with lettuce, onion, tomato, and ketchup/mustard    cup sweet potato fries    Seven- 250 calories, 18 g protein  Pork pot roast: 2 oz roasted pork loin, 1/3rd cup roasted carrots,   medium potato, cooked with   cup gravy    Eight- 330 calories, 25 g protein  2 oz meatballs (about 2 small meatballs)    cup spaghetti sauce  1/2 piece toast topped with 1 tsp ghee or butterand topped with garlic powder, toasted in oven    Nine- 250 calories, 16 g protein  Mexican pizza: one 8  corn tortilla topped with 2 oz chicken,   cup salsa, 2 tablespoons black beans, 2 tablespoons shredded cheese.  Bake until cheese is melted.    Ten- 250 calories, 22 g protein  Shrimp stir-razo: 3 oz cooked shrimp, 1/6th onion,   pepper,   cup chopped carrots sautéed in 1 tablespoon olive oil, topped with 2 tablespoons stir razo sauce and a pinch of sesame seeds        150 Calories or Less Snack Ideas   1 hardboiled egg with   cup berries  1 small apple with 1 hardboiled egg  10 almonds with   cup berries  2 clementines with 1 light string cheese  1 light string cheese with   sliced apple  1 light string cheese wrapped in 2 slices of turkey  4 100% whole wheat crackers  (e.g. Triscuit) with 1 light string cheese    c. cottage cheese with   cup fruit and 1 Tbsp sunflower seeds     cup cottage cheese with   of an avocado     can tuna fish with 1 cup sliced cucumbers     cup roasted garbanzo beans with paprika and cayenne pepper    baked sweet potato with   cup chili beans or   cup cottage cheese  2 oz. nitrate free turkey slices with 1 cup carrots  1 container (6 oz) of low sugar (less than 10 grams of sugar) greek yogurt   3 Tablespoons of hummus with 1 cup sliced bell peppers   2 Tablespoons of hummus with 15 baby carrots  4 Tablespoons ranch dip made with plain Greek Yogurt and 3 mini cucumbers  1/4 cup nuts (any kind)  1 Tablespoon peanut butter with 1 stalk celery   1 dill pickle wrapped in 1-2 slices of deli ham with 1 tsp of mayonnaise/mustard.      MEDICATIONS FOR WEIGHT LOSS  There are several medications available to assist us in weight loss.  By themselves, without a mindful change in diet and increase in movement/activity these medications are disappointing in their results. However, combined with a closely monitored program of diet change and exercise they can be very effective in controlling appetite and boosting initial weight loss.  All weight loss medications need continual re-evaluation for efficacy as their side effects and health benefits fail to be worthwhile if a person is not continuing to lose weight or in maintaining their healthy weight.  Some weight loss medications are scheduled drugs, meaning there is at least a theoretical possibility for developing addiction to them, but in practice this is rare.  We do anticipate coming off meds in the future- after stabilization of weight loss is assurred.  Finally, a tolerance can develop and people s perceived efficacy of medication can diminish.  In communication with your physician, it may be appropriate to intermittently take a break from these medications and then restart again (few weeks off then restart again)  if a plateau is reached that cannot be broken through.  Each person can respond to a medication differently and to be a good option for you, it will need to be affordable, effective and well tolerated with minimal side effects.    In most cases, weight loss progress after one month and three months will be obtained and if a patient is not reaching the satisfactory progress towards weight loss, the medications may be discontinued.  The thought is that if a person is taking a weight loss medication and not receiving the potential health benefit of that drug, the side effects are not worthwhile and use should be discontinued.  On the flip side, there are many people on some weight loss medications for years because it continues to be an effective tool in their weight management and they are tolerating the medication without any long-term side effects.  Each person's response and purpose will be evaluated.      PHENTERMINE (Adipex): approved in 1959 for appetite suppression.  It has stimulant effects and cannot be used with Ritalin, Concerta, or other stimulants.  Although it is not highly addictive, it's chemically related to amphetamines which are addictive and is classified as a Controlled Substance by the [place].  Occasional dependence can develop, but rarely. The most common side effects are dry mouth, increased energy and concentration, increased pulse, and constipation.  You should not take phentermine if you have glaucoma, hyperthyroidism, or uncontrolled/untreated hypertension or overly anxious. You should stop if dramatic mood swings, severe insomnia, palpations, chest pains, visual changes or if your Blood Pressure is consistently elevated or any time it's over 160/90.   It's ok to go off the med for a few weeks and restart if efficacy is wearing off.  $24-$30 for 90 tablets at [facility]. Females are required to have reliable birth control to reduce the risk birth defects/miscarriage.      TOPIRAMATE  (Topamax): Anti-seizure medication, also used to prevent migraines and sometimes for mood stabilization.  Side effects include paresthesia, glaucoma, altered concentration, attention difficulties, memory and speech problems, metabolic acidosis, depression, increase in body temperature and decrease sweating, risk of kidney stones.  Do not take Topamax while taking Depakote as this can cause high ammonia levels.  You must have reliable birth control as Topamax can cause birth defects.  If prolonged use has occurred it should be tapered off slowly to avoid withdrawal issues.  Insurance usually covers Topiramate.  At higher doses, there may be some confusion/forgetfulness associated with this so we try to limit dose to under 75mg twice daily to reduce this risk. Often covered by insurance as it's used for many reasons.  Topamax will cause carbonated beverages to taste bad. A recheck of your kidney/electrolytes may occur within a few months of starting.    QSYMIA (Phentermine + Topamax extended release):  See above information about phentermine and Topamax.  Most common side effects are paresthesia, dizziness, distortion of taste, insomnia, constipation, and dry mouth.  See above descriptions for the two individual agents.Females are required to have reliable birth control to reduce the risk birth defects/miscarriage.  $100-200 per month but coupons/programs exist at Qsymia.com that may reduce costs depending on a patient's coverage. Has  a low, medium and higher dosing option and usually titrating upwards is expected for continued good benefit and consideration for tapering downward or using lower dose in stabilization phases.      GLP1 Agonists:  Liraglutide (Victoza/Saxenda), Semaglutide (Ozempic/Wegovy):   Part of the family of Glucagon Like Peptide Agonists, these medications directly suppresses appetite and are often used by diabetic patients due to improvements in glucose/insulin balance.  They also slow how  quickly the stomach empties, increasing fullness. They may be hard to get covered for non diabetics and some plans have exclusions for weight loss purposes.  Currently, these are  injectable medications delivered via autoinjector pen. It can be very costly without insurance coverage (over $500/month).  Small risks for pancreatitis exists and dose should be held if increased mid abdominal pain/burning. It is not to be used if previous Multiple Endocrine Neoplasia. In rodents, may increase risk of thyroid tumors and not indicated for anyone with a history of medullary thyroid cancer as a result.  If changes in voice/swallowing should be discontinued. Reliable birth control required in women. Saxenda.[email or web address], Wegovy.com has more information on these medications.    Zepbound (Tirzepatide):  Similar in many ways to Wegovy/Saxenda type products, works by boosting satiety signals that improve sense of fullness/satiety, boosting both GIP and GLP1 hormones. Not to be used by those with Multiple endocrine neoplasia, medullary thyroid cancer and not likely tolerated well for those with recurrent/idiopathic pancreatitis issues. Costly without insurance coverage but very effective in curbing appetite. Once weekly injectable therapy. Nausea/upset stomach/constipation or diarrhea are common side effects. Heart burn can increase in some, as it slows stomach emptying speeds. Should be halted a few weeks prior to elective procedures and may require re-ramping afterwards. [employer] has good patient resources/information.    Contrave (Bupropion/Naltrexone).    Synergistic combination of a mild appetite suppressing anti-depressant (Bupropion) whose effects are increased due to interaction with Naltrexone.  Naltrexone may have some effects on craving and is often used in addiction medicine to help previous opiate addicts be less prone to relapse as it blocks the action of opiates. Should be stopped if any need for opiate pain medication,  "surgery or planned procedures where you'll be given sedation/anesthesia. If prolonged use, recommend stepping down bupropion over 2-3 weeks to limit any risk of withdrawal issues. Side effects may include dry mouth, increased heart rate, mild elevation in Blood pressure;  dizziness, ringing in the ears, anxiety (typically due to bupropion), nausea, constipation, and some get fatigued with naltrexone.  About $210 on [facility] for 120 tabs of \"Contrave\", the brand name without insurance coverage. Generic Bupropion 75mg: $25 for 120 tabs, Naltrexone: $55 for 90 tabs without insurance coverage on [employer]. Cannot be used if pregnant/trying to conceive or breast feeding.  Plenity:   NO LONGER AVAILABLE due to company going bankrupt. [email or web address].com has more information.        Zepbound (Tirzepatide) is a very effective satiety boosting appetite suppressant that elevates satiety hormones GLP1 and GIP. It needs to be ramped up slowly to be tolerated adequately.  About 1/10 people will not tolerate this medication. Each month, you move up to a higher dose until eventually reaching the 10mg/week dose if tolerated with further ramping to follow if needed. If intolerant or severe side effects, a dose decrease would be wise, so keep me posted if not tolerated the ramping well. This may be a longer term medication based on individual needs/physiology and appetite control.     Injections can be given after cleansing the skin with alcohol prep pad or swab (available OTC).     Stop Zepbound if severe abdominal pain/vomiting/rash/throat swelling or constant nausea that prevents adequate food/water intake. Stop 2-3 weeks prior to any planned general anesthesia surgeries to reduce risk for something called a post operative ileus.     Gallstones can occur in about 1% of patients on this medication so update me if increase right upper abdominal pain after eating.     Start meals with protein first, separate beverages from meals by 20 " minutes and work hard in between meals to get your 64-75 oz of water daily to reduce risks for severe constipation. Consider a fiber supplement like powdered psyllium husk in 12 oz water each night, stool softerners as needed and Miralax or milk of Magnesia if more than 3 days have passed without a Bowel Movement.     Check out [email or web address] for patient resources.  If you have weekends off, I recommend dosing Friday evenings.     Some people starve on this medication if not mindful about food intake. I recommend starting meals with the protein part of your meal first, chew thoroughly and separate beverages from meals by about 20 minutes to make sure you get your nourishment in first. Include vegetables/complex carbohydrates and unsaturated fat as part of your balanced diet but group these at the end of the meal, after your protein is mostly gone. Satiety will kick in too early if drinking too much with meals and under-nourishment can result.     It's not a bad idea to take a complete multivitamin most days of the week if using this medication. Adequate hydration is essential for feeling your best, efficient fat burning, waste elimination and constipation prevention. For those without fluid restrictions due to other disease, the goal is at least one ounce of water per gram of protein consumed with a  minimum of 64oz/day goal. Stool softners and fiber supplements as well as occasional laxative use (miralax, etc) may be necessary if getting too constipated.    Pancreatitis is a very rare but potentially serious side effect. Stop Zepbound if severe mid abdominal pain/burning in nature or if unable to eat/drink due to severe nausea/discomfort.   People with strong history of pancreatitis without clear cause should stay clear of this medication as should those planning to get pregnant, those with strong personal or family history for medullary thyroid cancer or Multiple Endocrine Neoplasia (rare).     Kind Regards,  Lars  MD Carmen  [facility]      Exercise Guidance    Nearly everything that bothers us gets better when the proper amount of exercise can be done in the proper amounts.  Getting to that level safely and without injury is the key.  When it comes to weight loss, exercise is especially important in maintaining the weight loss.  Unfortunately, one of the harsh realities is that substantial weight loss slows our metabolism, often anywhere from 5-20%.    Our brain always remembers our heaviest weight and we can return to that if we're not mindful and moving regularly.  Our biology doesn't understand the concept of having too much energy, only not having enough.  As such, when we lose weight, it's thought that the brain interprets this as we're ill or in a famine and dials back our metabolism to limit further weight loss.  This is why exercise is so important in keeping the weight off and is the main reason people have some weight regain from their low weight point after weight loss.  We have to make up that 10-20% of calories not being burned.Since we can restrict our intake for only so long, exercise becomes very important in our long term healthy weigh maintenance to balance out the occasional indiscretion with our diet.    Generally, for every 5% body weight reduction in a weight loss season, a person needs to add  kilocalories of exercise in their daily routine to keep that weight off for the long term.  This is why it's vital to be starting your fitness regimen during weight loss season, so that routine is well established as you move into your maintenance period.    Additionally, all sorts of good enzymes and genes turn on with exercise and our stress, sleep, mood and bodies feel better when we can get to the point of making ourselves a little sweaty and short of breath 35-50 minutes most days of the week. But we have to start with what we can do first and give ourselves  "permission to work our way up to this goal.    Who isn't ready for exercise? Well, if you get severe dizziness/palpitations, chest pain or short of breath/faint with even minimal activity like walking across a room or you're having to pause while going up a flight of stairs, then getting your heart and/or lungs fully evaluated prior to starting an exercise regimen is recommended. Everyone else can probably start a program, but everyone may start at a different point:  Some can set a 5-10 minute walking goal and others will be able to ride their bike for an hour.      Start with where you're at and look to add 10% more each week until you're at that 150 minutes or more a week (or 75 minutes/week or more of vigorous exercise). Moderate exercise can be estimated as the pace you can carry on a conversation and vigorous is the pace at which you can get 3-5 words out before having to take a breath.  If you're using heart rate monitoring, Moderate is about 60% of your maximum heart rate and vigorous about 75%. (Max heart rate estimated as 220 beats minus your age:  Example: 220-age of 43 =176 Beats per minute (BPM) maximum. 0.6X 176= 105 BPM (moderate), 132 BMP(vigorous)).    If you like to count steps, the 10,000 steps per day does correlate well with weight maintenance but try to make at least 20-25% of those steps at a brisk pace (like you are about to miss your bus).    Finally, if you are pressed for time, it's important to know that some exercise is better than none.  High Intensity Interval training (HIIT) is a good way to get as much out of a short period of working out. If you can't walk, use the stairs, bike or swim; you could use a punching/arm workout regimen for your activity.  The idea with HIIT is to have a 3-6 minute warm up period of low intensity and the 3-6 \"intervals\" where you push the intensity up and then recover and start the next interval. One study showed that 3 intervals of 20 seconds at \"Maximum " "Effort\" while either biking on a stationary bike or going up stairs and then having 100 seconds recovery time before the next Maximum Effort was equally as beneficial on cardiovascular fitness development as doing 30 minutes of moderately paced walking 3 days weekly over a 6 week period of time.  So intensity matters. You just need to be able to safely do your desired exercise without injury. There are many great HIIT exercises/routines out there. IF you're not doing much exercise currently, I recommend giving your self 2-3 weeks of moderate exercise, 3 days weekly minimum to get your bones/tendons/muscles used to exercise before going for High Intensity workouts.    If you like to use Apps on the phone, the couch to 5k anamaria and 7 minute workout apps are nice places to start if you are reasonably healthy.  There are hundreds of other options out there.  Consider viewing [employer] if gentler exercise/movement is desired. Videos on [place] and chair yoga for seniors exist and are free. Check them out and let's get that 3-4 days a week routine going.    Let's move!  Lars Morales MD.         "

## 2024-03-04 NOTE — LETTER
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 allowing me to participate in the care of your patient.        Sincerely,        Lars Morales MD  "
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
## 2024-03-05 ENCOUNTER — VIRTUAL VISIT (OUTPATIENT)
Dept: SURGERY | Facility: CLINIC | Age: 44
End: 2024-03-05
Payer: COMMERCIAL

## 2024-03-05 DIAGNOSIS — Z71.3 NUTRITIONAL COUNSELING: ICD-10-CM

## 2024-03-05 DIAGNOSIS — E66.01 OBESITY, CLASS III, BMI 40-49.9 (MORBID OBESITY) (H): Primary | ICD-10-CM

## 2024-03-05 PROCEDURE — 97802 MEDICAL NUTRITION INDIV IN: CPT | Mod: 95

## 2024-03-05 NOTE — PROGRESS NOTES
Melissa Jay is a 43 year old who is being evaluated via a billable video visit.        How would you like to obtain your AVS? MyChart  If the video visit is dropped, the invitation should be resent by: Text to cell phone: [phone]  Will anyone else be joining your video visit? No          Medical Weight Loss Initial Diet Evaluation  Assessment:  This patient was referred by Dr. Morales for MNT as treatment for Morbid obesity   Melissa is presenting today for a new weight management nutrition consultation. Pt has had an initial appointment with Dr. Morales.    Weight loss medication:  Zepbound . - pending      Anthropometrics:    Pt's weight is 279 lbs  Initial weight: 279 lbs   Weight change: n/a  BMI: There is no height or weight on file to calculate BMI.   Ideal body weight: 61.6 kg (135 lb 12.9 oz)  Adjusted ideal body weight: 87.6 kg (193 lb 1.3 oz)  Estimated RMR ([place]-St Jeor equation):  1950 kcals x 1.2 (sedentary) = 2340 kcals (for weight maintenance)    Recommended Protein Intake: 70-90 grams of protein/day    Medical History:  Patient Active Problem List   Diagnosis    Obesity    RW ALLERGIES/IMMUNOLOGY (ABSTRACTED)    Mild intermittent asthma    Depressive disorder, not elsewhere classified    allergic rhinnitis    Backache    Myalgia and myositis    Narcolepsy without cataplexy(347.00)    Other specified aftercare following surgery    Routine postpartum follow-up    CARDIOVASCULAR SCREENING; LDL GOAL LESS THAN 160    Dyspnea on exertion    Dermatitis herpetiformis    IBS (irritable bowel syndrome)    Overweight    Uterine leiomyoma          Nutrition History:   Food allergies/intolerances/cultural or religous food customs: Yes eggs, some dairy (milk), limits grains, corn, almonds, strawberries/blueberries, coffee, yeast (baker and brewers), tomatoes,   prefers to not eat a lot of meat     Weight loss history: Patient reports she thinks she is having other GI issues outside of weight concerns.  She is wanting to find sustainable eating habits that fit her lifestyle and food preferences. Noted she has met with RD's in the past with little results that impacted her personal benefit in regards to nutrition.   Hx with skipping breakfast-if she eats breakfast she feels hungry all day.    Vitamins/Mineral Supplementation: just started vitamin GEM    Dietary Recall:  Breakfast: yogurt with sweet potato   Lunch: leftovers  Dinner: grazing or pizza       Beverages:   Water 160oz   ELMT electrolytes   Occasional coke   Sparkling water         Nutrition Diagnosis (PES statement):   Morbid obesity related to excessive energy intake as evidence by patient subjective report and BMI of 43.7       Nutrition Intervention  Food and/or Nutrient Delivery   Placed emphasis on importance of developing a healthy meal routine, aiming for 3 meals a day and no snacks.  Discussed using a protein supplement as a meal replacement.  Nutrition Education   Discussed with patient how to build a meal: the importance of including a lean/low fat protein at each meal, include a source of vegetables at a minimum of lunch and dinner and limiting carbohydrate intake to 25% per meal.  Educated on sources of lean protein, portion sizes, the amount of grams found in each source. Recommend patient to aim for 20-30g protein at each meal.  Discussed the importance of adequate hydration, with emphasis on drinking 64oz of water or zero calorie beverages per day.  Nutrition Counseling   Encouraged importance of developing routine exercise for health benefits and weight loss.    Goals established by patient:   1600-1,900 kcal per day   70-90 (25-30g per emal)  Have 1 meal within 1/ 1.5 hours of waking (min 20g protein)      Handouts provided:  Intro to MWM      Assessment/Plan:    Pt will follow up in 3 month(s) with bariatrician and PRN with dietitian.         Video-Visit Details    Type of service:  Video Visit    Video Start Time (time video started):  1:00 PM    Video End Time (time video stopped): 1:25 PM    Originating Location (pt. Location): Home      Distant Location (provider location):  Off-site    Mode of Communication:  Video Conference via [facility]    Physician has received verbal consent for a Video Visit from the patient? Yes      Karina Gaxiola RD

## 2024-03-05 NOTE — PATIENT INSTRUCTIONS
Eat Better ? Move More ? Live Well    Eat 3 nutrient-rich meals each day     Don't skip meals--it will cause you to overeat later in the day!     Eating fiber (vegetables/fruits/whole grains) and protein with meals helps you stay full longer     Choose foods with less than 10 grams of sugar and 5 grams of fat per serving to prevent excess calories and weight re-gain   Eat around the same times each day to develop a routine eating schedule    Avoid snacking unless physically hungry.   Planned snacks: 1-2 times per day and no more than 150 calories    Eat protein first    Protein helps with healing, maintaining adequate muscle mass, reducing hunger and optimizing nutritional status    Aim for 70-90 grams of protein per day   Fill up on Fiber    Fiber comes from plants--fruits, veggies, whole grains, nuts/seeds and beans    Fiber is low in calories, high in phytonutrients and helps you stay full longer    Aim for 25-35 grams per day by eating fiber with meals and snacks  Eat S-[id number]    Take 20-30 minutes to eat each meal by taking small bites, chewing foods to applesauce consistency or 20-30 times before you swallow    Eating foods too fast can delay satiety/fullness signals and increase overeating   Slow down your eating by using toddler utensils, putting your fork/spoon down between bites and not watching TV or emailing during meals!   Keep a Journal          Writing down what you eat, how you feel and when you are active helps you identify new changes to work on from week to week          Look for ways to cut 100 calories from your current diet 2-3 times per day  Drink 64 ounces of 0-Calorie drinks between meals    Water    Zero calorie Propel  or Vitamin Water      SoBe [place]  Zero Calories    Crystal Light , Sugar-Free Som-Aid , and other sugar-free lemonade or flavored abdi    Keep Caffeine to less than 300mg per day ie: 3-6oz cups coffee     Work up to 45-60 minutes of physical activity most days of  the week    Helps with losing weight and prevent regaining those extra pounds!     Do a combo of cardio (walking/water exercises) and strength training (lifting weights/Vinyasa yoga)    Avoid Mindless Eating    Be present when you eat--take note of the smell, taste and quality of your food    Make a list of alternative activities you could do to prevent eating out of boredom/stress  Go for a walk, call a friend, chew gum, paint your nails, re-organize the garage, etc      LEAN PROTEIN SOURCES    Protein Source Portion Calories Grams of Protein                           Nonfat, plain Greek yogurt    (10 grams sugar or less) 3/4 cup (6 oz)  12-17   Light Yogurt (10 grams sugar or less) 3/4 cup (6 oz)  6-8   Protein Shake 1 shake 110-180 15-30   Skim/1% Milk or lactose-free milk 1 cup ( 8 oz)  8   Plain or light, flavored soymilk 1 cup  7-8   Plain or light, hemp milk 1 cup 110 6   Fat Free or 1% Cottage Cheese 1/2 cup 90 15   Part skim ricotta cheese 1/2 cup 100 14   Part skim or reduced fat cheese slices 1/4cup, 3 dice 65-80 8     Mozzarella String Cheese 1 80 8   Canned tuna, chicken, crab or salmon  (canned in water)  1/2 cup 100 15-20   White fish (broiled, grilled, baked) 3 ounces 100 21   [place]/Tuna (broiled, grilled, baked) 3 ounces 150-180 21   Shrimp, Scallops, Lobster, Crab 3 ounces 100 21   Pork loin, Pork Tenderloin 3 ounces 150 21   Boneless, skinless chicken /turkey breast                          (broiled, grilled, baked) 3 ounces 120 21   [place], [place], [place], and Venison 3 ounces 120 21   Lean cuts of red meat and pork (sirloin,   round, tenderloin, flank, ground 93%-96%) 3 ounces 170 21   Lean or Extra Lean Ground Turkey 1/2 cup 150 20   90-95% Lean [place] Burger 1 jessica 140-180 21   Low-fat casserole with lean meat 3/4 cup 200 17   Luncheon Meats                                                        (turkey, lean ham, roast beef, chicken) 3 ounces 100 21   Egg (boiled,  poached, scrambled) 1 Egg 60 7   Egg Substitute 1/2 cup 70 10   Nuts (limit to 1 serving per day)  3 Tbsp. 150 7   Nut Burgin (peanut, almond)  Limit to 1 serving or less daily 1 Tbsp. 90 4   Soy Burger (varies) 1  10-15   Edamame  1/2 cup ~95 9   Garbanzo, Black, Ivory Beans 1/2 cup 110 7   Refried Beans 1/2 cup 100 7   Kidney and Lima beans 1/2 cup 110 7   Tempeh 3 oz 175 18   Vegan crumbles 1/2 cup 100 14   Tofu 1/2 cup 110 14   Chili (beans and extra lean beef or turkey) 1 cup 200 23   Lentil Stew/Soup 1 cup 150 12   Black Bean Soup 1 cup 175 12     Carbohydrates  Carbohydrates fuel your body with glucose (sugar)--the energy your body needs so you can do your daily activities.  Carbohydrates offer an immediate source of energy for your body. They provide the fuel for your muscles and organs, such as your brain.     Types of Carbohydrates     Complex Carbohydrates are higher in fiber and keep you feeling full longer--helping you eat less.   These are found in nearly all plant-based foods and usually take longer for the body to digest.  They are most commonly found in whole-wheat bread, whole-grain pasta, brown rice, starchy vegetables,   and fruits  Refined Carbohydrates require almost NO WORK for digestion and break down into glucose more quickly   than complex carbohydrates. Refined carbohydrates are usually high in calories and low in nutrients and fiber--  eating more of these can lead to weight gain.  Thinking about eliminating carbohydrates???  If you do not eat enough carbohydrates, the following can occur:  Fatigue  Muscle cramps  Poor mental function  Fatigue easily results from deprivation of carbohydrates, which is seen in people who fast, possibly interfering with activities of daily living.      Thinking about eliminating carbohydrates???  If you do not eat enough carbohydrates, the following can occur:  Fatigue  Muscle cramps  Poor mental function  Fatigue easily results from deprivation of  carbohydrates, which is seen in people who fast, possibly interfering with activities of daily living    Carbohydrates are your body's first choice for fuel. If given a choice of several types of foods simultaneously, your body will use the energy from carbohydrates first.    What foods contain carbohydrates?  Choose the following foods containing carbohydrates (the BEST ones to eat):   Fruit-fresh, frozen, canned in their own juices  Whole grains:  Whole-wheat breads  Brown rice  Oatmeal  Whole-grain cereals  Other starchy foods containing a minimum of 3 grams (g) fiber/100 calories  The ingredient label should list whole wheat or whole grain as one of the first ingredients (bulgur, quinoa, buckwheat, millet, spelt, faro, kasha)  Milk or yogurt (a natural source of carbohydrates):  Low-fat milk  Fat-free milk  Yogurt   Beans or legumes     Starchy vegetables, raw or frozen:  Potatoes  Peas  Corn    AVOID or limit the following foods containing carbohydrates:  Refined sugars, such as in:  Candy  Desserts-ice cream, cakes, pies, brownies, frozen yogurt, sherbet/sorbet  Cookies  White flour: bread/pasta/crackers/rice/tortillas  Sugary snacks: sweetened cereal, granola bars, cereal bars, donuts, muffins, bagels  Sugary Drinks:  Fruit Juice, Smoothies  Sports Drinks  Regular Soda    What are typical serving sizes or portions?  The following are some serving and portion sizes for foods containing carbohydrates:  One medium piece of fruit, about 4?5 ounces (oz) (-tennis ball)  1 cup (C) berries or melon    C canned fruit    C juice (100% vegetable)    C starchy vegetables, cooked or chopped  One slice whole-grain bread  ? C brown rice, quinoa, buckwheat, millet, spelt, faro, kasha    C oatmeal (dry)    C bulgur  One small tortilla (less than 6inch diameter)    C wheat germ  1 oz pretzels     C flaked cereal        Calorie-Controlled Sample Meal Plans    Examples of small healthy meals    Breakfast   Omelet made with   cup  to   cup egg substitute or 2 eggs    cup chopped vegetables  1-2 tbsp. of light cheese     cup salsa  Medium banana    1 cup non-fat plain, Greek yogurt mixed with 1 cup berries and 1-2 Tbsp nuts or cereal   -3/4 cup skim or 1% cottage cheese    cup unsweetened whole-grain cereal  1/2 cup of fresh strawberries  Whole-wheat English muffin or mini bagel, 1 scrambled egg and 1 slice Swiss cheese   Small orange  Protein Bar or Shake (15-30 grams protein and 15-25 grams Carbohydrates)    cup cottage cheese, low-fat    cup fresh fruit    11 ounces of Slim Fast Low Carb (only), Juan Ramon's Advantage, EAS Carb Control    Lunch/Dinner  2-3 slices roasted turkey breast  1 tbsp. of fat free mayonnaise  2 slices of  whole-wheat bread, Medium apple  10 baby carrots with 1 tbsp. of low-fat dip     cup water packed tuna or chicken  1 tablespoons of low-fat mayonnaise  1-2 tbsp. dill relish  1 serving of whole-grain crackers  1 cup of strawberries   6 inch turkey sub sandwich with light mayonnaise,   cup cottage cheese                                                                                                                                                      Black bean and low-fat cheese on a whole wheat tortilla with salsa and light sour cream  Grilled chicken sandwich  Tossed salad with light dressing    Baked potato with 3/4 cup of extra lean ground beef, light shredded cheese and salsa  Fresh fruit                                                 Chicken chunks with lettuce and vegetables stuffed in nitin  Steamed broccoli                                                 3 oz boneless/skinless chicken breast  1/2 cup brown rice with stir-fried vegetables    grapefruit  3 ounces of salmon, trout, or tuna  1 cup of steamed asparagus  1 small slice whole grain Italian bread  Broiled white or pink fish  3/4 cup whole wheat pasta with tomatoes  3/4 cup of roasted red peppers  3 oz. of extra lean (93/7) hamburger on a Arnold's  [place] Thins  Tossed salad with light dressing       Black bean or Tuscan bean soup with grated mozzarella cheese    of a flour tortilla    3 ounces of grilled pork loin with 1 tbsp. of low-sugar barbeque sauce, 1 cup of green beans seasoned with pepper  Small dinner roll or   cup of grapefruit sections    1-2 cups of torn flaco    cup of garbanzo beans or diced skinless chicken breast  5-6 cherry tomatoes  1  tbsp. of crumbled feta cheese  1 tbsp. of roasted soy nuts  1 tsp. of olive oil and 2-3 Tbsp. of balsamic or red wine vinegar  Small whole-wheat dinner roll or   cup of cut up pineapple     Ana Seed Pudding:    Mix ana seeds with your choice of milk (almond, coconut, or regular milk).  Add a scoop of protein powder for extra protein.  Let it sit in the fridge overnight to thicken.  Serve with sliced fruits, nuts, or seeds on top.  Greek Yogurt Bowl:    Greek yogurt (plain or flavored).  Top with sliced bananas, berries, and a sprinkle of nuts or seeds (almonds, walnuts, ana seeds, or hemp seeds).  Optional: drizzle with honey or maple syrup for sweetness.    Protein Smoothie:    Blend together a combination of protein-rich ingredients such as spinach, kale, almond milk, tofu, Greek yogurt, nut butter (peanut butter, almond butter), and a scoop of protein powder.  Add frozen berries or banana for sweetness.  Optional: add ana seeds or flaxseeds for extra fiber and protein.    Vegan Protein Pancakes:    Mix together mashed bananas, oat flour, plant-based protein powder, and almond milk until you get a pancake batter consistency.  Cook on a non-stick pan with coconut oil or cooking spray.  Serve with sliced fruits, nut butter, or a drizzle of maple syrup.    Quinoa Breakfast Bowl:    Cooked quinoa mixed with almond milk, chopped nuts (almonds, walnuts), seeds (ana seeds, hemp seeds), and dried fruits (raisins, cranberries).  [place]amar with a touch of maple syrup or agave nectar.    Tofu  Scramble:    Crumble extra-firm tofu into a skillet and cook with chopped vegetables (bell peppers, onions, spinach) and spices (turmeric, cumin, paprika, salt, and pepper).  Serve with whole-grain toast or tortillas.    Plant-Based Protein Overnight Oats:    Mix rolled oats with your choice of plant-based milk (almond, coconut, soy).  Add a scoop of plant-based protein powder and mix well.  Let it sit in the fridge overnight.  Serve with sliced fruits, nuts, or seeds on top.  Nutty Granola Bowl:    Mix together your favorite nuts (almonds, walnuts, pecans), seeds (pumpkin seeds, sunflower seeds), and dried fruits (cranberries, apricots).  Serve with plant-based yogurt and a drizzle of maple syrup.      Protein Supplements       Type Serving Calories Protein Grams Sugar Grams Where Available   Muscle Milk Pro Series Shake 1 bottle  170 32 1 [facility], Target   Premier Protein Shake 1 bottle 160 30 1 [employer]'s/[employer]  [facility], Target, Cub   Equate High Performance Shake 1 bottle  160 30 1 [facility]   [facility] Nutrition Plan Shake 1 bottle 150 30 2 [employer], [facility]   Ensure Max Protein  Shake 1 bottle 150 30 1 [facility], Target   [employer] Core Power Shake 1 bottle 170 26 5 [facility]   Pure Protein Shake 1 bottle 110-170 23 1  [employer], [facility], Target   Slim Fast   High Protein Shake 1 bottle 180 20 1 [facility], Target, Grocery/Pharmacy   100kcal Muscle Milk Shake 1 bottle 100 20 0 [facility], Target, [facility],Grocery/Pharmacy   Juan Ramon's Lift Water 1 bottle 90 20 0 [facility], Target   Isopure Zero Carb Water   bottle 80 20 0 [employer], Vitamin Shoppe, online   Premier Protein Clear Water 1 bottle 90 20 0 [facility]    Unjury Protein+ Powder 1 scoop 90 20 0 [email or web address]  [email or web address]   Advantage Shake  1 bottle 160 15-17 1 [facility], Target  Grocery/Pharmacy   100kcal Muscle Milk Powder 1 scoop 100 15 0 [facility], [email or web address]   Protein 2-0 Water 1 bottle 60-70 15 0 [facility]         Plant-Based Protein Supplements      Type Serving  Calories Protein   Grams Sugar Grams  Where Available    Garden of Life Raw Protein Powder Powder 1 scoop 110 22 0 Whole Foods, Vitamin Shoppe, [email or web address]   Orgain Organic Protein Powder Powder 2 scoops 150 21 0 Target, [employer], [employer]co   Planted by Unjury Powder 1 scoop 130 20 3 [email or web address]  [email or web address]   Protein and Greens by [place] Powder  1 scoop 120 20 0 [facility], Target, Grocery/Pharmacy   Essentials Shake by [place] Powder 1 scoop 130 20 0 [employer], Target, Grocery/Pharmacy     Plant Fusion Orgain Plant Protein Powder 1 scoop 120 20 0 www.plantfusion.net   Orgain Grass Fed Shake Shake 1 bottle 140 20 4 Target, [facility]   Evolve Shake 1 bottle 150 20 4 Target, [place], Wal[employer]Mart   Sun Warrior Powder 1 scoop 100 17-18 0 [employer], Whole Foods   Whole Foods Fit Protein  Powder 1 scoop 110 17 0 Whole Foods   Garden of Life Plant Protein  Powder 1 scoop 90 15 0 Whole Foods, Vitamin Shoppe,   www.[email or web address]     Look for (per serving):  -100-200 calories  -15-30 grams protein   -Less than 10 grams total carbohydrate

## 2024-03-05 NOTE — LETTER
3/5/2024         RE: Melissa Jay  [address]  [place] [address]        Dear Colleague,    Thank you for referring your patient, Meilssa Jay, to the [facility] [place]. Please see a copy of my visit note below.    Melissa Jay is a 43 year old who is being evaluated via a billable video visit.        How would you like to obtain your AVS? MyChart  If the video visit is dropped, the invitation should be resent by: Text to cell phone: [phone]  Will anyone else be joining your video visit? No          Medical Weight Loss Initial Diet Evaluation  Assessment:  This patient was referred by Dr. Morales for MNT as treatment for Morbid obesity   Melissa is presenting today for a new weight management nutrition consultation. Pt has had an initial appointment with Dr. Morales.    Weight loss medication:  Zepbound . - pending      Anthropometrics:    Pt's weight is 279 lbs  Initial weight: 279 lbs   Weight change: n/a  BMI: There is no height or weight on file to calculate BMI.   Ideal body weight: 61.6 kg (135 lb 12.9 oz)  Adjusted ideal body weight: 87.6 kg (193 lb 1.3 oz)  Estimated RMR ([place]-St Jeor equation):  1950 kcals x 1.2 (sedentary) = 2340 kcals (for weight maintenance)    Recommended Protein Intake: 70-90 grams of protein/day    Medical History:  Patient Active Problem List   Diagnosis     Obesity     RW ALLERGIES/IMMUNOLOGY (ABSTRACTED)     Mild intermittent asthma     Depressive disorder, not elsewhere classified     allergic rhinnitis     Backache     Myalgia and myositis     Narcolepsy without cataplexy(347.00)     Other specified aftercare following surgery     Routine postpartum follow-up     CARDIOVASCULAR SCREENING; LDL GOAL LESS THAN 160     Dyspnea on exertion     Dermatitis herpetiformis     IBS (irritable bowel syndrome)     Overweight     Uterine leiomyoma          Nutrition History:   Food allergies/intolerances/cultural or  religous food customs: Yes eggs, some dairy (milk), limits grains, corn, almonds, strawberries/blueberries, coffee, yeast (baker and brewers), tomatoes,   prefers to not eat a lot of meat     Weight loss history: Patient reports she thinks she is having other GI issues outside of weight concerns. She is wanting to find sustainable eating habits that fit her lifestyle and food preferences. Noted she has met with RD's in the past with little results that impacted her personal benefit in regards to nutrition.   Hx with skipping breakfast-if she eats breakfast she feels hungry all day.    Vitamins/Mineral Supplementation: just started vitamin GEM    Dietary Recall:  Breakfast: yogurt with sweet potato   Lunch: leftovers  Dinner: grazing or pizza       Beverages:   Water 160oz   ELMT electrolytes   Occasional coke   Sparkling water         Nutrition Diagnosis (PES statement):   Morbid obesity related to excessive energy intake as evidence by patient subjective report and BMI of 43.7       Nutrition Intervention  Food and/or Nutrient Delivery   Placed emphasis on importance of developing a healthy meal routine, aiming for 3 meals a day and no snacks.  Discussed using a protein supplement as a meal replacement.  Nutrition Education   Discussed with patient how to build a meal: the importance of including a lean/low fat protein at each meal, include a source of vegetables at a minimum of lunch and dinner and limiting carbohydrate intake to 25% per meal.  Educated on sources of lean protein, portion sizes, the amount of grams found in each source. Recommend patient to aim for 20-30g protein at each meal.  Discussed the importance of adequate hydration, with emphasis on drinking 64oz of water or zero calorie beverages per day.  Nutrition Counseling   Encouraged importance of developing routine exercise for health benefits and weight loss.    Goals established by patient:   1600-1,900 kcal per day   70-90 (25-30g per  emal)  Have 1 meal within 1/ 1.5 hours of waking (min 20g protein)      Handouts provided:  Intro to MWM      Assessment/Plan:    Pt will follow up in 3 month(s) with bariatrician and PRN with dietitian.         Video-Visit Details    Type of service:  Video Visit    Video Start Time (time video started): 1:00 PM    Video End Time (time video stopped): 1:25 PM    Originating Location (pt. Location): Home      Distant Location (provider location):  Off-site    Mode of Communication:  Video Conference via [facility]    Physician has received verbal consent for a Video Visit from the patient? Yes      Karina Gaxiola RD         Again, thank you for allowing me to participate in the care of your patient.        Sincerely,        Karina Gaxiola RD

## 2024-04-04 DIAGNOSIS — R74.8 LOW SERUM HDL: ICD-10-CM

## 2024-04-04 DIAGNOSIS — E66.01 OBESITY, CLASS III, BMI 40-49.9 (MORBID OBESITY) (H): Primary | ICD-10-CM

## 2024-04-04 RX ORDER — SEMAGLUTIDE 1.7 MG/.75ML
1.7 INJECTION, SOLUTION SUBCUTANEOUS
Qty: 3 ML | Refills: 0 | Status: SHIPPED | OUTPATIENT
Start: 2024-07-05 | End: 2024-06-17

## 2024-04-04 RX ORDER — SEMAGLUTIDE 1 MG/.5ML
1 INJECTION, SOLUTION SUBCUTANEOUS
Qty: 2 ML | Refills: 0 | Status: SHIPPED | OUTPATIENT
Start: 2024-06-06 | End: 2024-06-17

## 2024-04-04 RX ORDER — SEMAGLUTIDE 0.25 MG/.5ML
0.25 INJECTION, SOLUTION SUBCUTANEOUS WEEKLY
Qty: 2 ML | Refills: 0 | Status: SHIPPED | OUTPATIENT
Start: 2024-04-11 | End: 2024-05-09

## 2024-04-04 RX ORDER — SEMAGLUTIDE 0.5 MG/.5ML
0.5 INJECTION, SOLUTION SUBCUTANEOUS
Qty: 2 ML | Refills: 0 | Status: SHIPPED | OUTPATIENT
Start: 2024-05-09 | End: 2024-06-06

## 2024-04-04 RX ORDER — SEMAGLUTIDE 2.4 MG/.75ML
2.4 INJECTION, SOLUTION SUBCUTANEOUS
Qty: 3 ML | Refills: 9 | Status: SHIPPED | OUTPATIENT
Start: 2024-08-02 | End: 2024-06-17

## 2024-04-04 NOTE — PROGRESS NOTES
Needs trial of Wegovy before zepbound considered by her insurance see denial letter. We'll ramp Wegovy accordingly if available/tolerated with at least 3 months at 2.4mg/week dose before insurance would consider Zepbound as alternative or otherwise intolerant.  Lars Morales MD

## 2024-06-17 ENCOUNTER — OFFICE VISIT (OUTPATIENT)
Dept: SURGERY | Facility: CLINIC | Age: 44
End: 2024-06-17
Payer: COMMERCIAL

## 2024-06-17 VITALS
SYSTOLIC BLOOD PRESSURE: 124 MMHG | DIASTOLIC BLOOD PRESSURE: 78 MMHG | HEIGHT: 67 IN | BODY MASS INDEX: 44.26 KG/M2 | WEIGHT: 282 LBS

## 2024-06-17 DIAGNOSIS — E66.01 CLASS 3 SEVERE OBESITY DUE TO EXCESS CALORIES WITH BODY MASS INDEX (BMI) OF 40.0 TO 44.9 IN ADULT, UNSPECIFIED WHETHER SERIOUS COMORBIDITY PRESENT (H): Primary | ICD-10-CM

## 2024-06-17 DIAGNOSIS — E66.813 CLASS 3 SEVERE OBESITY DUE TO EXCESS CALORIES WITH BODY MASS INDEX (BMI) OF 40.0 TO 44.9 IN ADULT, UNSPECIFIED WHETHER SERIOUS COMORBIDITY PRESENT (H): Primary | ICD-10-CM

## 2024-06-17 PROCEDURE — 99214 OFFICE O/P EST MOD 30 MIN: CPT | Performed by: EMERGENCY MEDICINE

## 2024-06-17 RX ORDER — SEMAGLUTIDE 1 MG/.5ML
1 INJECTION, SOLUTION SUBCUTANEOUS
Qty: 2 ML | Refills: 0 | Status: SHIPPED | OUTPATIENT
Start: 2024-08-16 | End: 2024-06-21

## 2024-06-17 RX ORDER — SEMAGLUTIDE 0.5 MG/.5ML
0.5 INJECTION, SOLUTION SUBCUTANEOUS
Qty: 2 ML | Refills: 0 | Status: SHIPPED | OUTPATIENT
Start: 2024-07-19 | End: 2024-06-21

## 2024-06-17 RX ORDER — BETAMETHASONE DIPROPIONATE 0.5 MG/G
CREAM TOPICAL EVERY 24 HOURS
COMMUNITY

## 2024-06-17 RX ORDER — SEMAGLUTIDE 1.7 MG/.75ML
1.7 INJECTION, SOLUTION SUBCUTANEOUS
Qty: 3 ML | Refills: 0 | Status: SHIPPED | OUTPATIENT
Start: 2024-09-13 | End: 2024-06-21

## 2024-06-17 RX ORDER — SEMAGLUTIDE 0.25 MG/.5ML
0.25 INJECTION, SOLUTION SUBCUTANEOUS WEEKLY
Qty: 2 ML | Refills: 0 | Status: SHIPPED | OUTPATIENT
Start: 2024-06-21 | End: 2024-06-21

## 2024-06-17 RX ORDER — SEMAGLUTIDE 2.4 MG/.75ML
2.4 INJECTION, SOLUTION SUBCUTANEOUS
Qty: 3 ML | Refills: 9 | Status: SHIPPED | OUTPATIENT
Start: 2024-10-11 | End: 2024-06-21
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
## 2024-06-17 NOTE — PATIENT INSTRUCTIONS
Latest Reference Range & Units 03/04/24 10:24   Sodium 135 - 145 mmol/L 141   Potassium 3.4 - 5.3 mmol/L 3.8   Chloride 98 - 107 mmol/L 104   Carbon Dioxide (CO2) 22 - 29 mmol/L 24   Urea Nitrogen 6.0 - 20.0 mg/dL 7.4   Creatinine 0.51 - 0.95 mg/dL 0.73   GFR Estimate >60 mL/min/1.73m2 >90   Calcium 8.6 - 10.0 mg/dL 9.3   Anion Gap 7 - 15 mmol/L 13   Albumin 3.5 - 5.2 g/dL 4.5   Protein Total 6.4 - 8.3 g/dL 7.0   Alkaline Phosphatase 40 - 150 U/L 100   ALT 0 - 50 U/L 11   AST 0 - 45 U/L 21   Bilirubin Total <=1.2 mg/dL 0.4   Glucose 70 - 99 mg/dL 90   Hemoglobin A1C 0.0 - 5.6 % 5.4   TSH 0.30 - 4.20 uIU/mL 1.07   Vitamin B12 232 - 1,245 pg/mL 447   Vitamin D, Total (25-Hydroxy) 20 - 50 ng/mL 25   WBC 4.0 - 11.0 10e3/uL 5.4   Hemoglobin 11.7 - 15.7 g/dL 13.7   Hematocrit 35.0 - 47.0 % 40.4   Platelet Count 150 - 450 10e3/uL 292   RBC Count 3.80 - 5.20 10e6/uL 4.72   MCV 78 - 100 fL 86   MCH 26.5 - 33.0 pg 29.0   MCHC 31.5 - 36.5 g/dL 33.9   RDW 10.0 - 15.0 % 12.8   Parathyroid Hormone Intact 15 - 65 pg/mL 38       Plan:   1.  Diet: Given increased walking, aiming for 1900kcal/day (max of 2000), if sedentary then 1650-1700kcal/day and 70-90 grams of lean protein daily.  Hydrating well with water to hit about  oz of water daily.   2. Exercise: great job with walking habits.  3. Medication: Hasn't been available, let's try the Pharmacy downstairs. Thursday or Friday evenings work best for most people that have weekends off (refer to handout below).   4. Labs looked good. No need to recheck levels today. It would be reasonable to use [id number] international unit(s) of vitamin D3 daily every August to May (25-50mcg) to keep levels optimized.   5. Goals: seeing 1-2.5 lbs/week of weight loss suggests you're right in a sustainable routine.         Example Meal Plan for a 0205-9046 Calorie Diet:    In order to fuel your weight loss properly and avoid hunger-induced overeating later in the day, for your height and  weight, you will enjoy the most success by following the diet below or similar with adjustments based on your particular tastes and preferences.  Exercise may influence speed, amount of weight loss further.     I recommend getting into a meal routine and keeping it similar day to day in the beginning so you don t have to think too hard about what you re going to make/eat.  Keep snacks healthy, ideally containing protein and some vegetables.  Non-processed food is preferable to packaged items.  Eat at least a few crunchy green vegetables if having a snack, which should be 2-3 hours after your mealtimes(prepare these ahead of time for ease of use).  Drink 64 oz -80 oz of water daily for most, some of you will need more and we'll discuss it at your visit if that is the case.      When changing our diet,  we can often mistake thirst for hunger or just have some distracted eating habits that we need to break free from ('bored/mindless eating', screen time,work, driving,etc).  A glass of water and reconsideration of our hunger is often all that is needed.  Having the urge is not the problem, but watching it pass by without acting on it is the goal.    If you re having hunger problems, add a protein drink/snack to your morning hours or afternoon snack with at least 20grams of protein and not too much sugar (under 10g).  A carton of higher protein/low sugar yogurt can work as well.  If the urge to snack is overwhelming and not satiated, try going for a 10 minute walk/exercise, come home and drink a glass of water and if still hungry, have a  calorie snack (handful of raw/sprouted nuts, veggies and string cheese, protein bar, etc).  Savor it.    It is better to have a large breakfast, a moderate lunch and a smaller dinner to fuel your day.  People lose 10-15% more weight during their weight loss season with this strategy. Optimizing your protein intake at each meal will further keep you more satisfied while eating less  food overall.  Getting exercise in early has also been shown to offer the best results (before breakfast ideally but anytime is the right time to exercise if that is not an option for you).    To make sure you re getting adequate vitamins and minerals during weight loss, I recommend one complete multivitamin a day of your choice.  Consider a probiotic and taking some vitamin D 2000 IU daily.    Let supper be your last meal of the day and ideally try to have at least 12 hours between supper and breakfast the next day to tap into some beneficial overnight fasting dynamics.  Midnight snacks need to go away. Water in the evening is fine, unsweetened, non caffeinated herbal tea is helpful as well.  Consolidating your meals within a 8-12 hour period of your day will help tap into these additional metabolic benefits and tends to keep your appetite up for breakfast, further helping to stay on track.  For most of my patients, I don't recommend an intermittent fasting style diet (many find it hard to fit in their lifestyle) but an overnight fast is very doable for most patients and helps regulate our hunger drives a little better.  This makes it very important to nail good intake at all three meals to feel satisfied/energized and still lose weight.      If evening snacking desires are high, consider a glass of fiber supplement for some additional fullness (metamucil or similar). Most of us don't get the 25-30 grams per day of fiber that promotes good gut health/satiety.  Benefiber, metamucil, citrucel are reasonable/affordable options for most people.  Inulin, chicory, psyllium husk are reasonable options but start slow and low in the dose to avoid gas/bloating until your gut gets acclimated (ramping up to 5-10 grams per day of supplemental fiber after 3-4 weeks if needed).      Example Meal Plan:  Breakfast: 450-475 Calories  1 egg cooked on low in olive oil:   calories.  5oz Greek Yogurt (Fage plain classic: ~150  eris)  Handful of Berries of your choice (about a calorie per berry or 20-40cal per handful)    cup(cooked) of  old fashioned oatmeal or 1/2 cup(cooked) steel cut oats. (150 eris)  Sprinkle amount of brown sugar and a pat of butter. (40 eris)  Glass of  Water  Black coffee or unsweetened Tea (0calories).      2-3 hours Later Snack: (195 calories).  Glass of water  One string Cheese (80 calories) or 4 oz creamed cottage cheese (115 calories) with  Crunchy Celery sticks (less than 10 calories per large stalk) 2 stalks. (20 calories)    of a  Large Banana or   of a Large Apple (60 calories):  eat second half at lunch or afternoon snack.     Lunch:300 -350 calories   Chicken Breast  (baked/broiled/roasted/grilled)  4-6 oz.  (125-180 eris), BBQ sauce/hot sauce/mustard/seasoning is free. Just use a reasonable amount. Or a can of tuna with 1 tablespoon mayonnaise.  Salad: lettuce, any other veggies (cucumbers, green peppers/celery you like and a small drizzle of dressing to just flavor.  Go as big on the veggies as you like,  as they are practically calorie free.   A whole, 8 inch cucumber is 45 calories, a whole green pepper is 23 calories, a stalk of celery is 9 calories.  [place] Dressing is 60 calories per tablespoon..so moderate your desired dressing or do a drizzle of olive oil and splash of balsamic vinegar on top,  Total calories unlikely to be over 150 even with dressing.  Glass of Water.    Option for lunch is meal replacement protein drink/smoothie.  Need at least 20 grams of protein and eat the rest of your apple/banana from the morning snack.      Afternoon Snack: 150-200 calories   Cheese Stick or cottage cheese again  and a fresh fruit OR  Granola Bar (protein Bar acceptable if under 200 calories OR  Homemade smoothies:  8oz skim milk,  a handful of berries (fresh or frozen and a serving of protein powder such as BiPro or Ginny sWhey for example.  If you don't like dairy, make with 8oz water, one small  banana, handful of berries and the protein powder, add any veggies you want as well:  roughly 200 calories.   Glass of Water    Dinner: 325 calories  4oz of fresh, Atlantic salmon.  Broiled (salt/pepper/dill) for about 8-8.5 minutes (200calories) or  4oz filet mignon steak or sirloin steak  Salad or vegetable sautéed lightly in olive oil or   Broccoli 1.5  cups chopped and steamed  or micro-waved in a little water (75 calories)  Glass of Water,    Cup of herbal tea (unsweetened, caffeine free)      Herbs and seasonings are encouraged to flavor your foods/vegetables.  Make your food delicious.      Tips for Success:  1.  Prepare proteins ahead of time (broil chicken breasts in bulk so you can grab and go), steel cut oats/lentils can be stored in casserole dish/bowl in the fridge for quick scoop in the morning and rewarm in microwave, make use of crock pot recipes (watch salt content).  Making meals that cover 3-4 future meals is an easy way to stay on track.  2.  Drink a 8-12 oz glass of water every 2-3 hours when awake.  We often mistake hunger for thirst, especially when losing weight.  3. Remember your Reward and Motivation when things get hard.  4.  Weigh yourself every morning and record, you'll stay on track better and learn how our biorhythms, diet and elimination patterns show up on the scale. Don't worry about 1 or 2 day patterns, but when on track you'll notice good trend downward of weight over 3-4 day segments.  Plateaus tend to resolve after 4-8 days in most cases if you stay consistent with your plan.  These are natural and part of weight loss, even if you're perfect with your plan execution.  5. Call if problems/concerns.  Next Caller is a great tool to stay in touch and provide weekly outside accountability. Check in with questions or if you want to brag.  6.  Find a handful of meals/foods that keep you on track and feeling good and get into a routine that is sustainable for you.  It's OK to have a  "routine that works for you.  7.  Consider taking a complete multivitamin just to make sure all micronutrients are adequate during weight loss.  8. If losing hair/brittle nails it usually means you are not taking enough protein.  Minimum goal is 60 grams daily of protein for smaller women, 80 grams a day for men. Consider taking Biotin as supplement or a \"Hair and Nail\" multivitamin.    Melissa, Increase portions described above by 20-25% if active to hit that 1900kcal/day range.       MEDICATIONS FOR WEIGHT LOSS  There are several medications available to assist us in weight loss.  By themselves, without a mindful change in diet and increase in movement/activity these medications are disappointing in their results. However, combined with a closely monitored program of diet change and exercise they can be very effective in controlling appetite and boosting initial weight loss.  All weight loss medications need continual re-evaluation for efficacy as their side effects and health benefits fail to be worthwhile if a person is not continuing to lose weight or in maintaining their healthy weight.  Some weight loss medications are scheduled drugs, meaning there is at least a theoretical possibility for developing addiction to them, but in practice this is rare.  We do anticipate coming off meds in the future- after stabilization of weight loss is assurred.  Finally, a tolerance can develop and people s perceived efficacy of medication can diminish.  In communication with your physician, it may be appropriate to intermittently take a break from these medications and then restart again (few weeks off then restart again) if a plateau is reached that cannot be broken through.  Each person can respond to a medication differently and to be a good option for you, it will need to be affordable, effective and well tolerated with minimal side effects.    In most cases, weight loss progress after one month and three months will be " obtained and if a patient is not reaching the satisfactory progress towards weight loss, the medications may be discontinued.  The thought is that if a person is taking a weight loss medication and not receiving the potential health benefit of that drug, the side effects are not worthwhile and use should be discontinued.  On the flip side, there are many people on some weight loss medications for years because it continues to be an effective tool in their weight management and they are tolerating the medication without any long-term side effects.  Each person's response and purpose will be evaluated.    PHENTERMINE (Adipex): approved in 1959 for appetite suppression.  It has stimulant effects and cannot be used with Ritalin, Concerta, or other stimulants.  Although it is not highly addictive, it's chemically related to amphetamines which are addictive and is classified as a Controlled Substance by the [place].  Occasional dependence can develop, but rarely. The most common side effects are dry mouth, increased energy and concentration, increased pulse, and constipation.  You should not take phentermine if you have glaucoma, hyperthyroidism, or uncontrolled/untreated hypertension or overly anxious. You should stop if dramatic mood swings, severe insomnia, palpations, chest pains, visual changes or if your Blood Pressure is consistently elevated or any time it's over 160/90.   It's ok to go off the med for a few weeks and restart if efficacy is wearing off.  $24-$30 for 90 tablets at [facility]. Females are required to have reliable birth control to reduce the risk birth defects/miscarriage.    TOPIRAMATE (Topamax): Anti-seizure medication, also used to prevent migraines and sometimes for mood stabilization.  Side effects include paresthesia, glaucoma, altered concentration, attention difficulties, memory and speech problems, metabolic acidosis, depression, increase in body temperature and decrease sweating, risk of  kidney stones.  Do not take Topamax while taking Depakote as this can cause high ammonia levels.  You must have reliable birth control as Topamax can cause birth defects.  If prolonged use has occurred it should be tapered off slowly to avoid withdrawal issues.  Insurance usually covers Topiramate.  At higher doses, there may be some confusion/forgetfulness associated with this so we try to limit dose to under 75mg twice daily to reduce this risk. Often covered by insurance as it's used for many reasons.  Topamax will cause carbonated beverages to taste bad. A recheck of your kidney/electrolytes may occur within a few months of starting.    QSYMIA (Phentermine + Topamax extended release):  See above information about phentermine and Topamax.  Most common side effects are paresthesia, dizziness, distortion of taste, insomnia, constipation, and dry mouth.  See above descriptions for the two individual agents.Females are required to have reliable birth control to reduce the risk birth defects/miscarriage.  $100-200 per month but coupons/programs exist at Qsymia.com that may reduce costs depending on a patient's coverage. Has  a low, medium and higher dosing option and usually titrating upwards is expected for continued good benefit and consideration for tapering downward or using lower dose in stabilization phases.    GLP1 Agonists:  Liraglutide (Victoza/Saxenda), Semaglutide (Ozempic/Wegovy):   Part of the family of Glucagon Like Peptide Agonists, these medications directly suppresses appetite and are often used by diabetic patients due to improvements in glucose/insulin balance.  In 2024, Wegovy also has been FDA approved for reducing risks of heart attacks in those with established coronary heart disease in those that are overweight or obese. These medications also slow how quickly the stomach empties, increasing fullness. They may be hard to get covered for non diabetics and some plans have exclusions for weight  loss purposes.  Currently, these are  injectable medications delivered via autoinjector pen. It can be very costly without insurance coverage (over $500/month).  Due to high demand, there have been cycles of unavailability that can affect the supply or ramping up of these medications.  Small risks for pancreatitis exists and dose should be held if increased mid abdominal pain/burning. It is not to be used if previous Multiple Endocrine Neoplasia. In rodents, may increase risk of thyroid tumors and not indicated for anyone with a history of medullary thyroid cancer as a result.  If changes in voice/swallowing should be discontinued. Reliable birth control required in women. Saxenda.[email or web address], Wegovy.com has more information on these medications.  It can take up to 6 weeks for some of these medications to get out of the body after the last injection.  Should be stopped a few weeks prior to elective surgical procedures and may require re-ramping afterwards.    Zepbound (Tirzepatide):  Similar in many ways to Wegovy/Saxenda type products, works by boosting satiety signals that improve sense of fullness/satiety, boosting both GIP and GLP1 hormones. Not to be used by those with Multiple endocrine neoplasia, medullary thyroid cancer and not likely tolerated well for those with recurrent/idiopathic pancreatitis issues. Costly without insurance coverage but very effective in curbing appetite. Currently has highest average weight reduction in clinical drug trials.  Once weekly injectable therapy. Nausea/upset stomach/constipation or diarrhea are common side effects. Heart burn can increase in some, as it slows stomach emptying speeds. Should be stopped a few weeks prior to elective surgical procedures and may require re-ramping afterwards. [employer] has good patient resources/information.    Contrave (Bupropion/Naltrexone).    Synergistic combination of a mild appetite suppressing anti-depressant (Bupropion) whose effects are  "increased due to interaction with Naltrexone.  Naltrexone may have some effects on craving and is often used in addiction medicine to help previous opiate addicts be less prone to relapse as it blocks the action of opiates. Should be stopped if any need for opiate pain medication, surgery or planned procedures where you'll be given sedation/anesthesia. If prolonged use, recommend stepping down bupropion over 2-3 weeks to limit any risk of withdrawal issues. Side effects may include dry mouth, increased heart rate, mild elevation in Blood pressure;  dizziness, ringing in the ears, anxiety (typically due to bupropion), nausea, constipation, and some get fatigued with naltrexone.  About $210 on [facility] for 120 tabs of \"Contrave\", the brand name without insurance coverage. Generic Bupropion 75mg: $25 for 120 tabs, Naltrexone: $55 for 90 tabs without insurance coverage on [facility]. Cannot be used if pregnant/trying to conceive or breast feeding.    Plenity:   NO LONGER AVAILABLE due to company going bankrupt. [email or web address].com has more information.       Wegovy (semaglutide) is a very effective satiety boosting appetite suppressant. It needs to be ramped up slowly to be tolerated adequately.  About 1/10 people will not tolerate this medication. Each month, you move up to a higher dose until eventually reaching the 2.4mg/week dose if tolerated. When in plentiful supply, one try at re-ramping is recommended if the initial ramping isn't tolerated well and if that re-ramping isn't tolerated well, it's best to avoid this medication.       Wegovy starts at 0.25mg/week for 4 weeks then increases to 0.5mg/week for 4 weeks then 1mg/week for 4 weeks then 1.7mg/week for 4 weeksthen 2.4mg/week usually for 6-9 months if tolerated well.  Injections can be given after cleansing the skin with alcohol prep pad or swab (available OTC).     Stop Wegovy if severe abdominal pain/vomiting/rash/throat swelling or constant nausea that prevents " adequate food/water intake. Stop 2-3 weeks prior to any planned general anesthesia surgeries to reduce risk for something called a post operative ileus.     Gallstones can occur in about 1% of patients on this medication so update me if increase right upper abdominal pain after eating.     Start meals with protein first, separate beverages from meals by 20 minutes and work hard in between meals to get your 64-75 oz of water daily to reduce risks for severe constipation. Consider a fiber supplement like powdered psyllium husk in 12 oz water each night, stool softerners as needed and Miralax or milk of Magnesia if more than 3 days have passed without a Bowel Movement.     Check out [facility] for patient resources.  If you have weekends off, I recommend dosing Friday evenings.     Some people starve on this medication if not mindful about food intake. I recommend starting meals with the protein part of your meal first, chew thoroughly and separate beverages from meals by about 20 minutes to make sure you get your nourishment in first. Include vegetables/complex carbohydrates and unsaturated fat as part of your balanced diet but group these at the end of the meal, after protein is mostly gone. Satiety will kick in too early if drinking too much with meals and under nourishment can result.     It's not a bad idea to take a complete multivitamin most days of the week if using this medication.     Pancreatitis is a very rare but potentially serious side effect. Stop Wegovy if severe mid abdominal pain/burning in nature or if unable to eat/drink due to severe nausea/discomfort.   People with strong history of pancreatitis without clear cause should stay clear of this medication as should those with strong personal or family history for medullary thyroid cancer or Multiple Endocrine Neoplasia (rare).     Stop Wegovy at least 2 weeks prior to any planned surgery.    Kind Regards,  Lars Moraels MD  [facility]        On-the-Go Breakfast Ideas  As of 2015, the latest research shows what a huge impact eating breakfast has on losing weight and feeling your best. People lose more weight when they make breakfast their biggest meal of the day compared to Dinner, but even if you cannot go to that degree, getting a breakfast that has at least 20 grams of protein and even a moderate amount of fat is ideal for maintaining good energy through the day and limits overeating in the evening hours.  The following are some quick and easy suggestions for at least getting something of substance into your body in the morning.  Enjoy!    Eating breakfast within 90 minutes of waking up is an important part of taking care of your body on a restricted calorie diet plan.  After sleeping for hours, your body is in need of fuel.  An ideal breakfast is a combination of protein, whole-grain carbohydrates, or fruit.  Here s why:    -Protein digests very slowly in the body, helping you feel more satisfied.  -Whole grains provide dietary fiber, which also digests slowly and helps keep your gut clean.  -Fruit is a great source of vitamins, minerals, and fiber.     Each one of these breakfast combinations has between 200-300 calories and 15-20 grams of protein.  Feel free to mix and match!    Bone Broth (chicken bone broth or beef bone broth) is a great way to boost protein content. 8oz of bone broth will typically have 9-12grams of protein for 40kcal of energy.    Protein: Choose  -1/2 cup low-fat cottage cheese  -2 hard boiled eggs , or one cooked in olive oil (low/slow heat).  -1 low fat string cheese stick  -1 Tablespoon natural peanut butter  -Minus vegetarian sausage jessica (found in freezer section)  -1 slice lowfat cheese  -6 oz 2% or lowfat Greek yogurt, such as Fage or Oikos.    PLUS    Whole Grains:  Choose   -1 whole wheat English muffin  -1 whole wheat nitin, half  -1/2 Fiber One frozen muffin, thawed  -1/2  Fiber One toaster pastry  -1 whole wheat bagel thin  -1/2 cup Kashi cereal  -1 Kashi waffle (or other whole grain high-fiber waffle)  Aim for whole grain/sprouted breads with at least 3g of fiber/slice if having bread. [place] is one such brand.    OR    Fruit: Choose  -1/3 cup blueberries  -1/2 banana (or a plantain- similar to a banana, yet smaller)  -1/2 cup cantaloupe cubes  -1 small apple  -1 small orange  -1/2 cup strawberries  -handful raspberries/blackberries (each berry is about 1 calorie).    *Adapted from Diabetes Living, Fall 20    Ten Breakfasts Under 250 calories    Ideally, getting between 350-600 calories  (depending on starting height and weight)for breakfast is ideal for avoiding hunger later in the day, adjust/add to the following accordingly:    One- 250 calories, 8.5 g protein  1 slice whole-grain toast   1 Tbsp peanut butter    banana    Two- 250 calories, 8 g protein    cup nonfat/lowfat yogurt  1/3rd cup diced no-sugar peaches  1/3rd cup cereal (like Special K, Cheerios, or bran flakes)    Three- 250 calories, 25 g protein  1 egg scrambled with 1 oz skim milk    cup shredded cheddar    whole grain English muffin  1 oz Ravalli muñoz  1 tsp margarine spread    Four- 225 calories, 25 g protein  1/2 cup Kashi Go-Lean cereal    cup skim milk mixed with 1 scoop Bariatric Advantage protein powder    cup no-sugar diced pears    Five- 250 calories, 20 g protein    cup oatmeal prepared with skim milk, 1 scoop protein powder, and sugar-free maple syrup    Six- 200 calories, 5 g protein  1 whole grain waffle, toasted  1 tablespoon creamy peanut or almond butter    Seven-  250 calories, 19 g protein  Breakfast sandwich: 1 slice whole grain toast, cut in half.  Add 1 scrambled egg and one slice cheddar  cheese.    Eight-  250 calories, 15 g protein  2 eggs scrambled with 1/3 cup frozen spinach (heat before adding to eggs) and 2 tablespoons low fat cream cheese.    Nine-  150 calories, 15 g  protein  2/3rd cup cottage cheese    cup cantaloupe    Ten- 200 calories, 20 g protein  Fruit smoothie made with 4 oz. nonfat Greek yogurt,   cup berries, 1 scoop protein powder, and 4 oz skim milk.    Ten Lunches Under 250 Calories    Aim for lunch to be around 300-400 calories a day when trying to lose weight and get that protein in!    One- 200 calories, 11 g protein  1/3 cup tuna salad made with light conner on 1 slice whole grain bread  1 small peeled apple    Two- 250 calories, 16 g protein  1/3 cup lowfat cottage cheese    cup cooked green beans    small fruit cocktail (in natural juice)    Three- 200 calories, 11 g protein    grilled cheese sandwich on whole grain bread with lowfat cheese  2/3rd cup of tomato soup    Four- 250 calories, 22 g protein  Deli wrap: 1 oz sliced turkey, 1 oz sliced ham, 1 oz sliced chicken rolled up with 1 slice low-fat cheese  1 small orange    Five- 250 calories, 28 g protein  2/3rd cup chili with 1 oz shredded cheese  4 saltine crackers    Six- 250 calories, 22 g protein  1 cup fresh spinach with 2 oz chicken, 1/3rd cup mandarin oranges, and 2 tablespoons sliced almonds with 1 tablespoon  vinaigrette dressing    Seven- 200 calories, 11 g protein  1 Tbsp sugar-free preserves and 1 Tbsp peanut butter on 1 slice whole grain toast    cup nonfat/lowfat Greek yogurt    Eight- 250 calories, 18 g protein  1 small soft-shell chicken taco with 1 oz shredded cheese, lettuce, tomato, salsa, and 1 Tbsp light sour cream    cup black beans    Nine- 225 calories, 13 g protein  2 ounces baked chicken  1/4 cup mashed potatoes    cup green beans    Ten- 200 calories, 21 g protein  Deli nitin: 2 oz roast beef or other deli meat with 1 tsp Bertram mayonnaise and sliced tomato, onion, and lettuce  1/3rd cup cottage cheese      Ten Dinners Under 300 calories    If you're eating a large breakfast and medium lunch, keep dinner small.  300-400 calories is ideal for most people depending on their caloric  needs.    One- 300 calories, 12 g protein  1-inch thick slice of turkey meatloaf    cup baked butternut squash    Two- 200 calories, 9 g protein  Bread-less BLT: 3 slices turkey muñoz, sliced tomato, wrapped in a large lettuce leaf    cup peeled fruit    Three- 275 calories, 36 g protein  3 oz roasted chicken    cup cooked broccoli    cup shredded cheddar cheese    cup unsweetened applesauce    Four- 200 calories, 25 g protein  3 oz baked tilapia  1/3rd cup cooked carrots    cup yogurt    Five- 250 calories, 20 g protein  Grilled ham  n  Swiss: spread 2 tsp ghee or butter on 1 slice of whole grain bread.  Cut bread in half, layer 2 oz deli ham with 1 piece of Swiss cheese and grill until cheese is melted.    cup cooked vegetables    Six- 250 calories, 18 g protein  Vegetarian cheeseburger: 1 Boca cheeseburger topped with lettuce, onion, tomato, and ketchup/mustard    cup sweet potato fries    Seven- 250 calories, 18 g protein  Pork pot roast: 2 oz roasted pork loin, 1/3rd cup roasted carrots,   medium potato, cooked with   cup gravy    Eight- 330 calories, 25 g protein  2 oz meatballs (about 2 small meatballs)    cup spaghetti sauce  1/2 piece toast topped with 1 tsp ghee or butterand topped with garlic powder, toasted in oven    Nine- 250 calories, 16 g protein  Mexican pizza: one 8  corn tortilla topped with 2 oz chicken,   cup salsa, 2 tablespoons black beans, 2 tablespoons shredded cheese.  Bake until cheese is melted.    Ten- 250 calories, 22 g protein  Shrimp stir-razo: 3 oz cooked shrimp, 1/6th onion,   pepper,   cup chopped carrots sautéed in 1 tablespoon olive oil, topped with 2 tablespoons stir razo sauce and a pinch of sesame seeds        150 Calories or Less Snack Ideas   1 hardboiled egg with   cup berries  1 small apple with 1 hardboiled egg  10 almonds with   cup berries  2 clementines with 1 light string cheese  1 light string cheese with   sliced apple  1 light string cheese wrapped in 2 slices of  turkey  4 100% whole wheat crackers (e.g. Triscuit) with 1 light string cheese    c. cottage cheese with   cup fruit and 1 Tbsp sunflower seeds     cup cottage cheese with   of an avocado     can tuna fish with 1 cup sliced cucumbers     cup roasted garbanzo beans with paprika and cayenne pepper    baked sweet potato with   cup chili beans or   cup cottage cheese  2 oz. nitrate free turkey slices with 1 cup carrots  1 container (6 oz) of low sugar (less than 10 grams of sugar) greek yogurt   3 Tablespoons of hummus with 1 cup sliced bell peppers   2 Tablespoons of hummus with 15 baby carrots  4 Tablespoons ranch dip made with plain Greek Yogurt and 3 mini cucumbers  1/4 cup nuts (any kind)  1 Tablespoon peanut butter with 1 stalk celery   1 dill pickle wrapped in 1-2 slices of deli ham with 1 tsp of mayonnaise/mustard.
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
## 2024-06-21 DIAGNOSIS — E66.813 CLASS 3 SEVERE OBESITY DUE TO EXCESS CALORIES WITH BODY MASS INDEX (BMI) OF 40.0 TO 44.9 IN ADULT, UNSPECIFIED WHETHER SERIOUS COMORBIDITY PRESENT (H): ICD-10-CM

## 2024-06-21 DIAGNOSIS — E66.01 CLASS 3 SEVERE OBESITY DUE TO EXCESS CALORIES WITH BODY MASS INDEX (BMI) OF 40.0 TO 44.9 IN ADULT, UNSPECIFIED WHETHER SERIOUS COMORBIDITY PRESENT (H): ICD-10-CM

## 2024-06-21 RX ORDER — SEMAGLUTIDE 2.4 MG/.75ML
2.4 INJECTION, SOLUTION SUBCUTANEOUS
Qty: 3 ML | Refills: 9 | Status: SHIPPED | OUTPATIENT
Start: 2024-10-17 | End: 2025-06-14

## 2024-06-21 RX ORDER — SEMAGLUTIDE 0.5 MG/.5ML
0.5 INJECTION, SOLUTION SUBCUTANEOUS
Qty: 2 ML | Refills: 0 | Status: SHIPPED | OUTPATIENT
Start: 2024-07-25 | End: 2024-08-22

## 2024-06-21 RX ORDER — SEMAGLUTIDE 0.25 MG/.5ML
0.25 INJECTION, SOLUTION SUBCUTANEOUS WEEKLY
Qty: 2 ML | Refills: 0 | Status: SHIPPED | OUTPATIENT
Start: 2024-06-27 | End: 2024-07-25

## 2024-06-21 RX ORDER — SEMAGLUTIDE 1 MG/.5ML
1 INJECTION, SOLUTION SUBCUTANEOUS
Qty: 2 ML | Refills: 0 | Status: SHIPPED | OUTPATIENT
Start: 2024-08-22 | End: 2024-09-19

## 2024-06-21 RX ORDER — SEMAGLUTIDE 1.7 MG/.75ML
1.7 INJECTION, SOLUTION SUBCUTANEOUS
Qty: 3 ML | Refills: 0 | Status: SHIPPED | OUTPATIENT
Start: 2024-09-19 | End: 2024-10-17

## 2024-08-08 ENCOUNTER — TELEPHONE (OUTPATIENT)
Dept: SURGERY | Facility: CLINIC | Age: 44
End: 2024-08-08
Payer: COMMERCIAL

## 2024-08-08 DIAGNOSIS — E66.01 CLASS 3 SEVERE OBESITY DUE TO EXCESS CALORIES WITH BODY MASS INDEX (BMI) OF 40.0 TO 44.9 IN ADULT, UNSPECIFIED WHETHER SERIOUS COMORBIDITY PRESENT (H): ICD-10-CM

## 2024-08-08 DIAGNOSIS — E66.813 CLASS 3 SEVERE OBESITY DUE TO EXCESS CALORIES WITH BODY MASS INDEX (BMI) OF 40.0 TO 44.9 IN ADULT, UNSPECIFIED WHETHER SERIOUS COMORBIDITY PRESENT (H): ICD-10-CM

## 2024-08-08 RX ORDER — SEMAGLUTIDE 1 MG/.5ML
1 INJECTION, SOLUTION SUBCUTANEOUS
Qty: 2 ML | Refills: 0 | Status: CANCELLED | OUTPATIENT
Start: 2024-08-22

## 2024-08-13 NOTE — TELEPHONE ENCOUNTER
Retail Pharmacy Prior Authorization Team   Phone: [phone]    PA Initiation    Medication: WEGOVY 1 MG/0.5ML SC SOAJ  Insurance Company: [employer] ([facility]) - Phone [phone] Fax [phone]  [facility]  Filling Pharmacy Phone:   Filling Pharmacy Fax:    Start Date: 8/13/2024      Note: Due to record-high volumes, our turn-around time is taking longer than usual . We are currently 6 days behind in the pools.   We are working diligently to submit all requests in a timely manner and in the order they are received. Please only flag TRUE URGENT requests as high priority to the pool at this time.   If you have questions on status of PA's,  please send a note/message in the active PA encounter and send back to the [facility] PA pool [[phone]].    If you have questions about the turn-around time or about our process, please reach out to our supervisor Natacha Polk.   Thank you!   RPPA (Retail Pharmacy Prior Authorization) team

## 2024-08-14 NOTE — TELEPHONE ENCOUNTER
Retail Pharmacy Prior Authorization Team   Phone: [phone]    Additional question set requested and sent back via CMM:        Note: Due to record-high volumes, our turn-around time is taking longer than usual . We are currently 6 days behind in the pools.   We are working diligently to submit all requests in a timely manner and in the order they are received. Please only flag TRUE URGENT requests as high priority to the pool at this time.   If you have questions on status of PA's,  please send a note/message in the active PA encounter and send back to the [facility] PA pool [[phone]].    If you have questions about the turn-around time or about our process, please reach out to our supervisor Natacha Polk.   Thank you!   RPPA (Retail Pharmacy Prior Authorization) team

## 2024-08-23 NOTE — TELEPHONE ENCOUNTER
Retail Pharmacy Prior Authorization Team   Phone: [phone]    Called insurance to follow up on the request. Talked with a rep at [employer] - states that the request had been denied and will fax the denial letter to the office.     PRIOR AUTHORIZATION DENIED    PA WAS PROCESSED FOR CONTINUATION DUE TO A PREVIOUS APPROVAL ON FILE - SPOKE WITH REP AT [facility] THAT STATED AN APPEAL MUST BE COMPLETED AND TO NOTE THAT THERE WAS A GAP IN THERAPY DUE TO THE SHORTAGE    Medication: WEGOVY 1 MG/0.5ML SC SOAJ  Insurance Company: [employer] ([facility]) - Phone [phone] Fax [phone]  Denial Date: 8/13/2024  Denial Reason(s): MUST MEET CRITERIA FOR CONTINUATION OF THERAPY      Appeal Information: IF THE PROVIDER WOULD LIKE TO APPEAL THIS DECISION PLEASE PROVIDE THE PA TEAM WITH A LETTER OF MEDICAL NECESSITY      Patient Notified: NO

## 2024-08-26 ENCOUNTER — ALLIED HEALTH/NURSE VISIT (OUTPATIENT)
Dept: SURGERY | Facility: CLINIC | Age: 44
End: 2024-08-26
Payer: COMMERCIAL

## 2024-08-26 VITALS — HEIGHT: 67 IN | WEIGHT: 277.6 LBS | BODY MASS INDEX: 43.57 KG/M2

## 2024-08-26 DIAGNOSIS — E66.01 CLASS 3 SEVERE OBESITY DUE TO EXCESS CALORIES WITH BODY MASS INDEX (BMI) OF 40.0 TO 44.9 IN ADULT, UNSPECIFIED WHETHER SERIOUS COMORBIDITY PRESENT (H): ICD-10-CM

## 2024-08-26 DIAGNOSIS — E66.01 MORBID OBESITY WITH BMI OF 40.0-44.9, ADULT (H): Primary | ICD-10-CM

## 2024-08-26 DIAGNOSIS — E66.813 CLASS 3 SEVERE OBESITY DUE TO EXCESS CALORIES WITH BODY MASS INDEX (BMI) OF 40.0 TO 44.9 IN ADULT, UNSPECIFIED WHETHER SERIOUS COMORBIDITY PRESENT (H): ICD-10-CM

## 2024-08-26 PROCEDURE — 97803 MED NUTRITION INDIV SUBSEQ: CPT | Performed by: DIETITIAN, REGISTERED

## 2024-08-26 NOTE — PROGRESS NOTES
Melissa Jay is a 44 year old who is being evaluated via a billable in-person visit.      Medical  Weight Loss Follow-Up Diet Evaluation  Assessment:  Melissa is presenting today for a follow up weight management nutrition consultation.  This patient has had an initial appointment and was referred by Dr. Morales for MNT as treatment for Morbid obesity   Weight loss medication:  Wegovy  - insurance denied  Pt's weight is 277.6 lbs   Initial weight: 279 lbs  Weight change: -1.4 lbs        6/14/2024    10:21 AM   Changes and Difficulties   I have made the following changes to my diet since my last visit: Eating more calories, protein. Drinking less water   With regards to my diet, I am still struggling with: Quality calories   I have made the following changes to my activity/exercise since my last visit: Walking between 1-2.5 miles a day   With regards to my activity/exercise, I am still struggling with: My body hurting so I struggle to do more     BMI: 43.48  Ideal body weight: 61.6 kg (135 lb 12.9 oz)  Adjusted ideal body weight: 88.1 kg (194 lb 4.5 oz)    Estimated RMR ([place]-St Jeor equation):   1950 kcals x 1.2 (sedentary) = 2340 kcals (for weight maintenance)  Recommended Protein Intake: 70-90 grams of protein/day  Patient Active Problem List:  Patient Active Problem List   Diagnosis    Obesity    RW ALLERGIES/IMMUNOLOGY (ABSTRACTED)    Mild intermittent asthma    Depressive disorder, not elsewhere classified    allergic rhinnitis    Backache    Myalgia and myositis    Narcolepsy without cataplexy(347.00)    Other specified aftercare following surgery    Routine postpartum follow-up    CARDIOVASCULAR SCREENING; LDL GOAL LESS THAN 160    Dyspnea on exertion    Dermatitis herpetiformis    IBS (irritable bowel syndrome)    Overweight    Uterine leiomyoma   Diabetes: no, A1C of 5.4% on 3/4/2024    Nutrition History:   Food allergies/intolerances/cultural or religous food customs: Yes eggs, some dairy (milk),  limits grains, corn, almonds, strawberries/blueberries, coffee, yeast (baker and brewers), tomatoes, prefers to not eat a lot of meat      Weight loss history: Patient reports she thinks she is having other GI issues outside of weight concerns. She is wanting to find sustainable eating habits that fit her lifestyle and food preferences. Noted she has met with RD's in the past with little results that impacted her personal benefit in regards to nutrition.   Hx with skipping breakfast-if she eats breakfast she feels hungry all day.    Progress on goals from last visit: Patient is having issues with cooking d/t back pain. Patient tries to have something every 2-3 hours - 1 cup of food - more if she goes over that time frame. Discussed using collagen powders to help increase protein intake - using a fish based. Patient is meeting with [facility] regarding her results from a scope and colonoscopy in a couple of weeks - patient has dx of reactive gastropathy. Encouraged patient to request a visit with a GI dietitian d/t new GI diagnosis. Patient's GI doctor is planning on doing further allergy testing with patient and breath test for h pylori.     Patient is unable to tolerate dairy based yogurt, vegan protein powders with pea proteins, eggs, sesame, strawberries/berries, watermelon, corn, beans (chickpeas) pea proteins, almonds    Okay with vegetable medley, most cooked vegetables, iceberg lettuce, cucumbers, cooked fruit - apples, pears, cantaloupe, cherries, tuna from the can, white fish (walleye, tilapia), pork products    Goals (3/5/2024)   1,600-1,900 kcal per day   70-90 (25-30g per emal)  Have 1 meal within 1/ 1.5 hours of waking (min 20g protein)    Dietary Recall:  Breakfast: packet of instant oats (with flavorings) with water  Lunch: chicken tenderloin pieces  Dinner: hamburgers  Nutrition Diagnosis:    Morbid obesity related to excessive energy intake as evidence by patient subjective report and BMI of  44.17      Intervention:  Food and/or nutrient delivery: discussed ways to increase protein intake with meals. Using collagen powder to help increase protein intake.     Monitoring/Evaluation:    Goals:  Aim for 70 grams of protein per day    Patient to follow up in 2 month(s) with bariatrician       In-Person Duration: 45 minutes      Location (provider location):  On-site    Mode of Communication:  In Person    Susan Baumann RD

## 2024-08-26 NOTE — PROGRESS NOTES
Appeal letter written as patient should be allowed to ramp up Wegovy therapy in light of supply chain shortages for the first 6 months after her prescription was approved last year.  Kind Regards,  Lars Morales MD  [facility]

## 2024-08-26 NOTE — TELEPHONE ENCOUNTER
Medication Appeal Initiation    Medication: WEGOVY 1 MG/0.5ML SC SOAJ  Appeal Start Date:   8/26/2024  Insurance Company: [employer]

## 2024-08-26 NOTE — LETTER
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 movement/activity these medications are disappointing in their results. However, combined with a closely monitored program of diet change and exercise they can be very effective in controlling appetite and boosting initial weight loss.  All weight loss medications need continual re-evaluation for efficacy as their side effects and health benefits fail to be worthwhile if a person is not continuing to lose weight or in maintaining their healthy weight.  Some weight loss medications are scheduled drugs, meaning there is at least a theoretical possibility for developing addiction to them, but in practice this is rare.  We do anticipate coming off meds in the future- after stabilization of weight loss is assurred.  Finally, a tolerance can develop and people s perceived efficacy of medication can diminish.  In communication with your physician, it may be appropriate to intermittently take a break from these medications and then restart again (few weeks off then restart again) if a plateau is reached that cannot be broken through.  Each person can respond to a " medication differently and to be a good option for you, it will need to be affordable, effective and well tolerated with minimal side effects.     In most cases, weight loss progress after one month and three months will be obtained and if a patient is not reaching the satisfactory progress towards weight loss, the medications may be discontinued.  The thought is that if a person is taking a weight loss medication and not receiving the potential health benefit of that drug, the side effects are not worthwhile and use should be discontinued.  On the flip side, there are many people on some weight loss medications for years because it continues to be an effective tool in their weight management and they are tolerating the medication without any long-term side effects.  Each person's response and purpose will be evaluated.     PHENTERMINE (Adipex): approved in 1959 for appetite suppression.  It has stimulant effects and cannot be used with Ritalin, Concerta, or other stimulants.  Although it is not highly addictive, it's chemically related to amphetamines which are addictive and is classified as a Controlled Substance by the [place].  Occasional dependence can develop, but rarely. The most common side effects are dry mouth, increased energy and concentration, increased pulse, and constipation.  You should not take phentermine if you have glaucoma, hyperthyroidism, or uncontrolled/untreated hypertension or overly anxious. You should stop if dramatic mood swings, severe insomnia, palpations, chest pains, visual changes or if your Blood Pressure is consistently elevated or any time it's over 160/90.   It's ok to go off the med for a few weeks and restart if efficacy is wearing off.  $24-$30 for 90 tablets at [facility]. Females are required to have reliable birth control to reduce the risk birth defects/miscarriage.     TOPIRAMATE (Topamax): Anti-seizure medication, also used to prevent migraines and sometimes for mood  stabilization.  Side effects include paresthesia, glaucoma, altered concentration, attention difficulties, memory and speech problems, metabolic acidosis, depression, increase in body temperature and decrease sweating, risk of kidney stones.  Do not take Topamax while taking Depakote as this can cause high ammonia levels.  You must have reliable birth control as Topamax can cause birth defects.  If prolonged use has occurred it should be tapered off slowly to avoid withdrawal issues.  Insurance usually covers Topiramate.  At higher doses, there may be some confusion/forgetfulness associated with this so we try to limit dose to under 75mg twice daily to reduce this risk. Often covered by insurance as it's used for many reasons.  Topamax will cause carbonated beverages to taste bad. A recheck of your kidney/electrolytes may occur within a few months of starting.     QSYMIA (Phentermine + Topamax extended release):  See above information about phentermine and Topamax.  Most common side effects are paresthesia, dizziness, distortion of taste, insomnia, constipation, and dry mouth.  See above descriptions for the two individual agents.Females are required to have reliable birth control to reduce the risk birth defects/miscarriage.  $100-200 per month but coupons/programs exist at Qsymia.com that may reduce costs depending on a patient's coverage. Has  a low, medium and higher dosing option and usually titrating upwards is expected for continued good benefit and consideration for tapering downward or using lower dose in stabilization phases.     GLP1 Agonists:  Liraglutide (Victoza/Saxenda), Semaglutide (Ozempic/Wegovy):   Part of the family of Glucagon Like Peptide Agonists, these medications directly suppresses appetite and are often used by diabetic patients due to improvements in glucose/insulin balance.  In 2024, Wegovy also has been FDA approved for reducing risks of heart attacks in those with established coronary  heart disease in those that are overweight or obese. These medications also slow how quickly the stomach empties, increasing fullness. They may be hard to get covered for non diabetics and some plans have exclusions for weight loss purposes.  Currently, these are  injectable medications delivered via autoinjector pen. It can be very costly without insurance coverage (over $500/month).  Due to high demand, there have been cycles of unavailability that can affect the supply or ramping up of these medications.  Small risks for pancreatitis exists and dose should be held if increased mid abdominal pain/burning. It is not to be used if previous Multiple Endocrine Neoplasia. In rodents, may increase risk of thyroid tumors and not indicated for anyone with a history of medullary thyroid cancer as a result.  If changes in voice/swallowing should be discontinued. Reliable birth control required in women. Saxenda.com, Wegovy.com has more information on these medications.  It can take up to 6 weeks for some of these medications to get out of the body after the last injection.  Should be stopped a few weeks prior to elective surgical procedures and may require re-ramping afterwards.     Zepbound (Tirzepatide):  Similar in many ways to Wegovy/Saxenda type products, works by boosting satiety signals that improve sense of fullness/satiety, boosting both GIP and GLP1 hormones. Not to be used by those with Multiple endocrine neoplasia, medullary thyroid cancer and not likely tolerated well for those with recurrent/idiopathic pancreatitis issues. Costly without insurance coverage but very effective in curbing appetite. Currently has highest average weight reduction in clinical drug trials.  Once weekly injectable therapy. Nausea/upset stomach/constipation or diarrhea are common side effects. Heart burn can increase in some, as it slows stomach emptying speeds. Should be stopped a few weeks prior to elective surgical procedures and  "may require re-ramping afterwards. [facility] has good patient resources/information.     Contrave (Bupropion/Naltrexone).    Synergistic combination of a mild appetite suppressing anti-depressant (Bupropion) whose effects are increased due to interaction with Naltrexone.  Naltrexone may have some effects on craving and is often used in addiction medicine to help previous opiate addicts be less prone to relapse as it blocks the action of opiates. Should be stopped if any need for opiate pain medication, surgery or planned procedures where you'll be given sedation/anesthesia. If prolonged use, recommend stepping down bupropion over 2-3 weeks to limit any risk of withdrawal issues. Side effects may include dry mouth, increased heart rate, mild elevation in Blood pressure;  dizziness, ringing in the ears, anxiety (typically due to bupropion), nausea, constipation, and some get fatigued with naltrexone.  About $210 on [facility] for 120 tabs of \"Contrave\", the brand name without insurance coverage. Generic Bupropion 75mg: $25 for 120 tabs, Naltrexone: $55 for 90 tabs without insurance coverage on [employer]. Cannot be used if pregnant/trying to conceive or breast feeding.     Plenity:   NO LONGER AVAILABLE due to company going bankrupt. [email or web address].com has more information.         Wegovy (semaglutide) is a very effective satiety boosting appetite suppressant. It needs to be ramped up slowly to be tolerated adequately.  About 1/10 people will not tolerate this medication. Each month, you move up to a higher dose until eventually reaching the 2.4mg/week dose if tolerated. When in plentiful supply, one try at re-ramping is recommended if the initial ramping isn't tolerated well and if that re-ramping isn't tolerated well, it's best to avoid this medication.        Wegovy starts at 0.25mg/week for 4 weeks then increases to 0.5mg/week for 4 weeks then 1mg/week for 4 weeks then 1.7mg/week for 4 weeksthen 2.4mg/week usually for " 6-9 months if tolerated well.  Injections can be given after cleansing the skin with alcohol prep pad or swab (available OTC).      Stop Wegovy if severe abdominal pain/vomiting/rash/throat swelling or constant nausea that prevents adequate food/water intake. Stop 2-3 weeks prior to any planned general anesthesia surgeries to reduce risk for something called a post operative ileus.      Gallstones can occur in about 1% of patients on this medication so update me if increase right upper abdominal pain after eating.      Start meals with protein first, separate beverages from meals by 20 minutes and work hard in between meals to get your 64-75 oz of water daily to reduce risks for severe constipation. Consider a fiber supplement like powdered psyllium husk in 12 oz water each night, stool softerners as needed and Miralax or milk of Magnesia if more than 3 days have passed without a Bowel Movement.      Check out [employer] for patient resources.  If you have weekends off, I recommend dosing Friday evenings.      Some people starve on this medication if not mindful about food intake. I recommend starting meals with the protein part of your meal first, chew thoroughly and separate beverages from meals by about 20 minutes to make sure you get your nourishment in first. Include vegetables/complex carbohydrates and unsaturated fat as part of your balanced diet but group these at the end of the meal, after protein is mostly gone. Satiety will kick in too early if drinking too much with meals and under nourishment can result.      It's not a bad idea to take a complete multivitamin most days of the week if using this medication.      Pancreatitis is a very rare but potentially serious side effect. Stop Wegovy if severe mid abdominal pain/burning in nature or if unable to eat/drink due to severe nausea/discomfort.   People with strong history of pancreatitis without clear cause should stay clear of this medication as should  those with strong personal or family history for medullary thyroid cancer or Multiple Endocrine Neoplasia (rare).      Stop Wegovy at least 2 weeks prior to any planned surgery.     Kind Regards,  Lars Morales MD  [facility]           On-the-Go Breakfast Ideas  As of 2015, the latest research shows what a huge impact eating breakfast has on losing weight and feeling your best. People lose more weight when they make breakfast their biggest meal of the day compared to Dinner, but even if you cannot go to that degree, getting a breakfast that has at least 20 grams of protein and even a moderate amount of fat is ideal for maintaining good energy through the day and limits overeating in the evening hours.  The following are some quick and easy suggestions for at least getting something of substance into your body in the morning.  Enjoy!     Eating breakfast within 90 minutes of waking up is an important part of taking care of your body on a restricted calorie diet plan.  After sleeping for hours, your body is in need of fuel.  An ideal breakfast is a combination of protein, whole-grain carbohydrates, or fruit.  Here s why:     -Protein digests very slowly in the body, helping you feel more satisfied.  -Whole grains provide dietary fiber, which also digests slowly and helps keep your gut clean.  -Fruit is a great source of vitamins, minerals, and fiber.      Each one of these breakfast combinations has between 200-300 calories and 15-20 grams of protein.  Feel free to mix and match!     Bone Broth (chicken bone broth or beef bone broth) is a great way to boost protein content. 8oz of bone broth will typically have 9-12grams of protein for 40kcal of energy.     Protein: Choose  -1/2 cup low-fat cottage cheese  -2 hard boiled eggs , or one cooked in olive oil (low/slow heat).  -1 low fat string cheese stick  -1 Tablespoon natural peanut butter  -[employer] vegetarian sausage  jessica (found in freezer section)  -1 slice lowfat cheese  -6 oz 2% or lowfat Greek yogurt, such as Fage or Oikos.     PLUS     Whole Grains:  Choose   -1 whole wheat English muffin  -1 whole wheat nitin, half  -1/2 Fiber One frozen muffin, thawed  -1/2 Fiber One toaster pastry  -1 whole wheat bagel thin  -1/2 cup Kashi cereal  -1 Kashi waffle (or other whole grain high-fiber waffle)  Aim for whole grain/sprouted breads with at least 3g of fiber/slice if having bread. [place] is one such brand.     OR     Fruit: Choose  -1/3 cup blueberries  -1/2 banana (or a plantain- similar to a banana, yet smaller)  -1/2 cup cantaloupe cubes  -1 small apple  -1 small orange  -1/2 cup strawberries  -handful raspberries/blackberries (each berry is about 1 calorie).     *Adapted from Diabetes Living, Fall 20     Ten Breakfasts Under 250 calories     Ideally, getting between 350-600 calories  (depending on starting height and weight)for breakfast is ideal for avoiding hunger later in the day, adjust/add to the following accordingly:     One- 250 calories, 8.5 g protein  1 slice whole-grain toast   1 Tbsp peanut butter    banana     Two- 250 calories, 8 g protein    cup nonfat/lowfat yogurt  1/3rd cup diced no-sugar peaches  1/3rd cup cereal (like Special K, Cheerios, or bran flakes)     Three- 250 calories, 25 g protein  1 egg scrambled with 1 oz skim milk    cup shredded cheddar    whole grain English muffin  1 oz Palauan muñoz  1 tsp margarine spread     Four- 225 calories, 25 g protein  1/2 cup Kashi Go-Lean cereal    cup skim milk mixed with 1 scoop Bariatric Advantage protein powder    cup no-sugar diced pears     Five- 250 calories, 20 g protein    cup oatmeal prepared with skim milk, 1 scoop protein powder, and sugar-free maple syrup     Six- 200 calories, 5 g protein  1 whole grain waffle, toasted  1 tablespoon creamy peanut or almond butter     Seven-  250 calories, 19 g protein  Breakfast sandwich: 1 slice whole grain  toast, cut in half.  Add 1 scrambled egg and one slice cheddar  cheese.     Eight-  250 calories, 15 g protein  2 eggs scrambled with 1/3 cup frozen spinach (heat before adding to eggs) and 2 tablespoons low fat cream cheese.     Nine-  150 calories, 15 g protein  2/3rd cup cottage cheese    cup cantaloupe     Ten- 200 calories, 20 g protein  Fruit smoothie made with 4 oz. nonfat Greek yogurt,   cup berries, 1 scoop protein powder, and 4 oz skim milk.     Ten Lunches Under 250 Calories     Aim for lunch to be around 300-400 calories a day when trying to lose weight and get that protein in!     One- 200 calories, 11 g protein  1/3 cup tuna salad made with light conner on 1 slice whole grain bread  1 small peeled apple     Two- 250 calories, 16 g protein  1/3 cup lowfat cottage cheese    cup cooked green beans    small fruit cocktail (in natural juice)     Three- 200 calories, 11 g protein    grilled cheese sandwich on whole grain bread with lowfat cheese  2/3rd cup of tomato soup     Four- 250 calories, 22 g protein  Deli wrap: 1 oz sliced turkey, 1 oz sliced ham, 1 oz sliced chicken rolled up with 1 slice low-fat cheese  1 small orange     Five- 250 calories, 28 g protein  2/3rd cup chili with 1 oz shredded cheese  4 saltine crackers     Six- 250 calories, 22 g protein  1 cup fresh spinach with 2 oz chicken, 1/3rd cup mandarin oranges, and 2 tablespoons sliced almonds with 1 tablespoon  vinaigrette dressing     Seven- 200 calories, 11 g protein  1 Tbsp sugar-free preserves and 1 Tbsp peanut butter on 1 slice whole grain toast    cup nonfat/lowfat Greek yogurt     Eight- 250 calories, 18 g protein  1 small soft-shell chicken taco with 1 oz shredded cheese, lettuce, tomato, salsa, and 1 Tbsp light sour cream    cup black beans     Nine- 225 calories, 13 g protein  2 ounces baked chicken  1/4 cup mashed potatoes    cup green beans     Ten- 200 calories, 21 g protein  Deli nitin: 2 oz roast beef or other deli meat with 1  tsp Bertram mayonnaise and sliced tomato, onion, and lettuce  1/3rd cup cottage cheese        Ten Dinners Under 300 calories     If you're eating a large breakfast and medium lunch, keep dinner small.  300-400 calories is ideal for most people depending on their caloric needs.     One- 300 calories, 12 g protein  1-inch thick slice of turkey meatloaf    cup baked butternut squash     Two- 200 calories, 9 g protein  Bread-less BLT: 3 slices turkey muñoz, sliced tomato, wrapped in a large lettuce leaf    cup peeled fruit     Three- 275 calories, 36 g protein  3 oz roasted chicken    cup cooked broccoli    cup shredded cheddar cheese    cup unsweetened applesauce     Four- 200 calories, 25 g protein  3 oz baked tilapia  1/3rd cup cooked carrots    cup yogurt     Five- 250 calories, 20 g protein  Grilled ham  n  Swiss: spread 2 tsp ghee or butter on 1 slice of whole grain bread.  Cut bread in half, layer 2 oz deli ham with 1 piece of Swiss cheese and grill until cheese is melted.    cup cooked vegetables     Six- 250 calories, 18 g protein  Vegetarian cheeseburger: 1 Boca cheeseburger topped with lettuce, onion, tomato, and ketchup/mustard    cup sweet potato fries     Seven- 250 calories, 18 g protein  Pork pot roast: 2 oz roasted pork loin, 1/3rd cup roasted carrots,   medium potato, cooked with   cup gravy     Eight- 330 calories, 25 g protein  2 oz meatballs (about 2 small meatballs)    cup spaghetti sauce  1/2 piece toast topped with 1 tsp ghee or butterand topped with garlic powder, toasted in oven     Nine- 250 calories, 16 g protein  Mexican pizza: one 8  corn tortilla topped with 2 oz chicken,   cup salsa, 2 tablespoons black beans, 2 tablespoons shredded cheese.  Bake until cheese is melted.     Ten- 250 calories, 22 g protein  Shrimp stir-razo: 3 oz cooked shrimp, 1/6th onion,   pepper,   cup chopped carrots sautéed in 1 tablespoon olive oil, topped with 2 tablespoons stir razo sauce and a pinch of sesame seeds    "        150 Calories or Less Snack Ideas   1 hardboiled egg with   cup berries  1 small apple with 1 hardboiled egg  10 almonds with   cup berries  2 clementines with 1 light string cheese  1 light string cheese with   sliced apple  1 light string cheese wrapped in 2 slices of turkey  4 100% whole wheat crackers (e.g. Triscuit) with 1 light string cheese    c. cottage cheese with   cup fruit and 1 Tbsp sunflower seeds     cup cottage cheese with   of an avocado     can tuna fish with 1 cup sliced cucumbers     cup roasted garbanzo beans with paprika and cayenne pepper    baked sweet potato with   cup chili beans or   cup cottage cheese  2 oz. nitrate free turkey slices with 1 cup carrots  1 container (6 oz) of low sugar (less than 10 grams of sugar) greek yogurt   3 Tablespoons of hummus with 1 cup sliced bell peppers   2 Tablespoons of hummus with 15 baby carrots  4 Tablespoons ranch dip made with plain Greek Yogurt and 3 mini cucumbers  1/4 cup nuts (any kind)  1 Tablespoon peanut butter with 1 stalk celery   1 dill pickle wrapped in 1-2 slices of deli ham with 1 tsp of mayonnaise/mustard.               After Visit Summary (Automatic SnapShot taken 6/17/2024)  Additional Documentation    Vitals: /78     Ht 1.702 m (5' 7\")     Wt 127.9 kg (282 lb)     BMI 44.17 kg/m      BSA 2.46 m           More Vitals   Flowsheets: Patient-Reported Data,     Anthropometrics,     Vitals Reassessment,     BAR ROOMING FLOWSHEET   Encounter Info: Billing Info,     History,     Allergies,     Detailed Report     Communications      Letter sent to Sarah Brito MD  Myc Communicable Disease Screening    Question 6/14/2024 10:17 AM CDT - Filed by Patient 3/4/2024  7:11 AM CDT - Filed by Patient   Do you have any of the following new or worsening symptoms ? None of these None of these   Have you had close contact with someone who has traveled outside the country in the past 30 days and is sick? No No     [facility] " Add'L Questions    Question 6/14/2024 10:17 AM CDT - Filed by Patient   Please upload your Photo ID - examples:  's Licence, State ID,  ID, Passport      [id number]   [id number] [employer] Version 1.1    Question 6/14/2024 10:19 AM CDT - Filed by Patient 3/4/2024  7:15 AM CDT - Filed by Patient   In general, would you say your health is: Good Fair   In general, would you say your quality of life is: Good Fair   In general, how would you rate your physical health? Fair Fair   In general, how would you rate your mental health, including your mood and your ability to think? Good Good   In general, how would you rate your satisfaction with your social activities and relationships? Good Fair   In general, please rate how well you carry out your usual social activities and roles. (This includes activities at home, at work and in your community, and responsibilities as a parent, child, spouse, employee, friend, etc.) Fair Fair   To what extent are you able to carry out your everyday physical activities such as walking, climbing stairs, carrying groceries, or moving a chair? Completely Moderately   In the past 7 days     How often have you been bothered by emotional problems such as feeling anxious, depressed or irritable? Never Rarely   How would you rate your fatigue on average? Severe Moderate   How would you rate your pain on average?   0 = No Pain  to  10 = Worst Imaginable Pain 6 6     [facility] Return Questionnaire    Question 6/14/2024 10:21 AM CDT - Filed by Patient   I have made the following changes to my diet since my last visit: Eating more calories, protein. Drinking less water   With regards to my diet, I am still struggling with: Quality calories   I have made the following changes to my activity/exercise since my last visit: Walking between 1-2.5 miles a day   With regards to my activity/exercise, I am still struggling with: My body hurting so I  struggle to do more   Which weight loss medications are you currently taking on a regular basis? None   If you are not taking a weight loss medication that was prescribed to you, please indicate why: Other   Are you having any side effects from the weight loss medication that we have prescribed you? No     AVS Reports    Date/Time Report Action User   6/17/2024  8:16 AM After Visit Summary Automatically Generated Lars Morales MD     Encounter Information    Encounter Information   Provider Department Encounter # Center   6/17/2024 8:00 AM [id number]     Reviewed this Encounter     Medications Problems Allergies History   Lars Morales MD   Reviewed Tobacco   Myra Fischer MA   Reviewed Tobacco     Communicable/Travel screen     important suggestion  No data to display     Level of Service Calculator Selections        I spent a total of 30 minutes on the day of the visit.                Please see the note for further information on patient assessment and treatment.              Orders Placed    None  Medication Changes      Semaglutide-Weight Management      0.25 mg Subcutaneous WEEKLY, 4 pens. Will ramp up dose monthly if tolerating well to goal of 2.4mg/week eventually.    0.5 mg Subcutaneous EVERY 7 DAYS, 4 pens    1 mg Subcutaneous EVERY 7 DAYS, 4 pens    1.7 mg Subcutaneous EVERY 7 DAYS, 4 pens    2.4 mg Subcutaneous EVERY 7 DAYS, 4 pens  Medication List  Visit Diagnoses      Class 3 severe obesity due to excess calories with body mass index (BMI) of 40.0 to 44.9 in adult, unspecified whether serious comorbidity present (H)  Problem List       Virtual Visit  3/5/2024  [facility]       Karina Gaxiola RD  Dietitian, Registered Obesity, Class III, BMI 40-49.9 (morbid obesity) (H) +1 more  Dx     Progress Notes  Karina Gaxiola RD (Registered Dietitian)  Dietitian, Registered  Melissa Jay is a 43 year  old who is being evaluated via a billable video visit.          How would you like to obtain your AVS? MyChart  If the video visit is dropped, the invitation should be resent by: Text to cell phone: [phone]  Will anyone else be joining your video visit? No              Medical Weight Loss Initial Diet Evaluation  Assessment:  This patient was referred by Dr. Morales for MNT as treatment for Morbid obesity   Melissa is presenting today for a new weight management nutrition consultation. Pt has had an initial appointment with Dr. Morales.     Weight loss medication:  Zepbound . - pending        Anthropometrics:     Pt's weight is 279 lbs  Initial weight: 279 lbs   Weight change: n/a  BMI: There is no height or weight on file to calculate BMI.   Ideal body weight: 61.6 kg (135 lb 12.9 oz)  Adjusted ideal body weight: 87.6 kg (193 lb 1.3 oz)  Estimated RMR ([place]-St Jeor equation):  1950 kcals x 1.2 (sedentary) = 2340 kcals (for weight maintenance)     Recommended Protein Intake: 70-90 grams of protein/day     Medical History:      Patient Active Problem List   Diagnosis    Obesity    RW ALLERGIES/IMMUNOLOGY (ABSTRACTED)    Mild intermittent asthma    Depressive disorder, not elsewhere classified    allergic rhinnitis    Backache    Myalgia and myositis    Narcolepsy without cataplexy(347.00)    Other specified aftercare following surgery    Routine postpartum follow-up    CARDIOVASCULAR SCREENING; LDL GOAL LESS THAN 160    Dyspnea on exertion    Dermatitis herpetiformis    IBS (irritable bowel syndrome)    Overweight    Uterine leiomyoma            Nutrition History:   Food allergies/intolerances/cultural or religous food customs: Yes eggs, some dairy (milk), limits grains, corn, almonds, strawberries/blueberries, coffee, yeast (baker and brewers), tomatoes,   prefers to not eat a lot of meat      Weight loss history: Patient reports she thinks she is having other GI issues outside of weight concerns. She is  wanting to find sustainable eating habits that fit her lifestyle and food preferences. Noted she has met with RD's in the past with little results that impacted her personal benefit in regards to nutrition.   Hx with skipping breakfast-if she eats breakfast she feels hungry all day.     Vitamins/Mineral Supplementation: just started vitamin GEM     Dietary Recall:  Breakfast: yogurt with sweet potato   Lunch: leftovers  Dinner: grazing or pizza         Beverages:   Water 160oz   ELMT electrolytes   Occasional coke   Sparkling water            Nutrition Diagnosis (PES statement):   Morbid obesity related to excessive energy intake as evidence by patient subjective report and BMI of 43.7         Nutrition Intervention  Food and/or Nutrient Delivery   Placed emphasis on importance of developing a healthy meal routine, aiming for 3 meals a day and no snacks.  Discussed using a protein supplement as a meal replacement.  Nutrition Education   Discussed with patient how to build a meal: the importance of including a lean/low fat protein at each meal, include a source of vegetables at a minimum of lunch and dinner and limiting carbohydrate intake to 25% per meal.  Educated on sources of lean protein, portion sizes, the amount of grams found in each source. Recommend patient to aim for 20-30g protein at each meal.  Discussed the importance of adequate hydration, with emphasis on drinking 64oz of water or zero calorie beverages per day.  Nutrition Counseling   Encouraged importance of developing routine exercise for health benefits and weight loss.     Goals established by patient:   1600-1,900 kcal per day   70-90 (25-30g per emal)  Have 1 meal within 1/ 1.5 hours of waking (min 20g protein)        Handouts provided:  Intro to MWM        Assessment/Plan:     Pt will follow up in 3 month(s) with bariatrician and PRN with dietitian.            Video-Visit Details     Type of service:  Video Visit     Video Start Time (time  video started): 1:00 PM     Video End Time (time video stopped): 1:25 PM    Originating Location (pt. Location): Home        Distant Location (provider location):  Off-site     Mode of Communication:  Video Conference via [facility]     Physician has received verbal consent for a Video Visit from the patient? Yes        Karina Gaxiola RD           Other Notes    All notes        Instructions    Eat Better ? Move More ? Live Well     Eat 3 nutrient-rich meals each day     Don't skip meals--it will cause you to overeat later in the day!     Eating fiber (vegetables/fruits/whole grains) and protein with meals helps you stay full longer     Choose foods with less than 10 grams of sugar and 5 grams of fat per serving to prevent excess calories and weight re-gain   Eat around the same times each day to develop a routine eating schedule    Avoid snacking unless physically hungry.   Planned snacks: 1-2 times per day and no more than 150 calories    Eat protein first    Protein helps with healing, maintaining adequate muscle mass, reducing hunger and optimizing nutritional status    Aim for 70-90 grams of protein per day   Fill up on Fiber    Fiber comes from plants--fruits, veggies, whole grains, nuts/seeds and beans    Fiber is low in calories, high in phytonutrients and helps you stay full longer    Aim for 25-35 grams per day by eating fiber with meals and snacks  Eat S-[id number]    Take 20-30 minutes to eat each meal by taking small bites, chewing foods to applesauce consistency or 20-30 times before you swallow    Eating foods too fast can delay satiety/fullness signals and increase overeating   Slow down your eating by using toddler utensils, putting your fork/spoon down between bites and not watching TV or emailing during meals!   Keep a Journal          Writing down what you eat, how you feel and when you are active helps you identify new changes to work on from week to week          Look for ways to cut 100  calories from your current diet 2-3 times per day  Drink 64 ounces of 0-Calorie drinks between meals    Water    Zero calorie Propel  or Vitamin Water      SoBe [place]  Zero Calories    Crystal Light , Sugar-Free Som-Aid , and other sugar-free lemonade or flavored abdi    Keep Caffeine to less than 300mg per day ie: 3-6oz cups coffee      Work up to 45-60 minutes of physical activity most days of the week    Helps with losing weight and prevent regaining those extra pounds!     Do a combo of cardio (walking/water exercises) and strength training (lifting weights/Vinyasa yoga)     Avoid Mindless Eating    Be present when you eat--take note of the smell, taste and quality of your food    Make a list of alternative activities you could do to prevent eating out of boredom/stress  Go for a walk, call a friend, chew gum, paint your nails, re-organize the garage, etc        LEAN PROTEIN SOURCES     Protein Source   Portion   Calories   Grams of Protein                             Nonfat, plain Greek yogurt    (10 grams sugar or less) 3/4 cup (6 oz)  12-17   Light Yogurt (10 grams sugar or less) 3/4 cup (6 oz)  6-8   Protein Shake 1 shake 110-180 15-30   Skim/1% Milk or lactose-free milk 1 cup ( 8 oz)  8   Plain or light, flavored soymilk 1 cup  7-8   Plain or light, hemp milk 1 cup 110 6   Fat Free or 1% Cottage Cheese 1/2 cup 90 15   Part skim ricotta cheese 1/2 cup 100 14   Part skim or reduced fat cheese slices 1/4cup, 3 dice 65-80 8                                 Mozzarella String Cheese 1 80 8   Canned tuna, chicken, crab or salmon  (canned in water)  1/2 cup 100 15-20   White fish (broiled, grilled, baked) 3 ounces 100 21   [place]/Tuna (broiled, grilled, baked) 3 ounces 150-180 21   Shrimp, Scallops, Lobster, Crab 3 ounces 100 21   Pork loin, Pork Tenderloin 3 ounces 150 21   Boneless, skinless chicken /turkey breast                          (broiled, grilled, baked) 3 ounces 120 21    [place], [place], [place], and Venison 3 ounces 120 21   Lean cuts of red meat and pork (sirloin,   round, tenderloin, flank, ground 93%-96%) 3 ounces 170 21   Lean or Extra Lean Ground Turkey 1/2 cup 150 20   90-95% Lean [place] Burger 1 jessica 140-180 21   Low-fat casserole with lean meat 3/4 cup 200 17   Luncheon Meats                                                        (turkey, lean ham, roast beef, chicken) 3 ounces 100 21   Egg (boiled, poached, scrambled) 1 Egg 60 7   Egg Substitute 1/2 cup 70 10   Nuts (limit to 1 serving per day)  3 Tbsp. 150 7   Nut [place] (peanut, almond)  Limit to 1 serving or less daily 1 Tbsp. 90 4   Soy Burger (varies) 1  10-15   Edamame  1/2 cup ~95 9   Garbanzo, Black, Ivory Beans 1/2 cup 110 7   Refried Beans 1/2 cup 100 7   Kidney and Lima beans 1/2 cup 110 7   Tempeh 3 oz 175 18   Vegan crumbles 1/2 cup 100 14   Tofu 1/2 cup 110 14   Chili (beans and extra lean beef or turkey) 1 cup 200 23   Lentil Stew/Soup 1 cup 150 12   Black Bean Soup 1 cup 175 12      Carbohydrates  Carbohydrates fuel your body with glucose (sugar)--the energy your body needs so you can do your daily activities.  Carbohydrates offer an immediate source of energy for your body. They provide the fuel for your muscles and organs, such as your brain.      Types of Carbohydrates      Complex Carbohydrates are higher in fiber and keep you feeling full longer--helping you eat less.   These are found in nearly all plant-based foods and usually take longer for the body to digest.  They are most commonly found in whole-wheat bread, whole-grain pasta, brown rice, starchy vegetables,   and fruits  Refined Carbohydrates require almost NO WORK for digestion and break down into glucose more quickly   than complex carbohydrates. Refined carbohydrates are usually high in calories and low in nutrients and fiber--  eating more of these can lead to weight gain.  Thinking about eliminating carbohydrates???  If you do not eat  enough carbohydrates, the following can occur:  Fatigue  Muscle cramps  Poor mental function  Fatigue easily results from deprivation of carbohydrates, which is seen in people who fast, possibly interfering with activities of daily living.        Thinking about eliminating carbohydrates???  If you do not eat enough carbohydrates, the following can occur:  Fatigue  Muscle cramps  Poor mental function  Fatigue easily results from deprivation of carbohydrates, which is seen in people who fast, possibly interfering with activities of daily living     Carbohydrates are your body's first choice for fuel. If given a choice of several types of foods simultaneously, your body will use the energy from carbohydrates first.     What foods contain carbohydrates?  Choose the following foods containing carbohydrates (the BEST ones to eat):   Fruit-fresh, frozen, canned in their own juices  Whole grains:  Whole-wheat breads  Brown rice  Oatmeal  Whole-grain cereals  Other starchy foods containing a minimum of 3 grams (g) fiber/100 calories  The ingredient label should list whole wheat or whole grain as one of the first ingredients (bulgur, quinoa, buckwheat, millet, spelt, faro, kasha)  Milk or yogurt (a natural source of carbohydrates):  Low-fat milk  Fat-free milk  Yogurt   Beans or legumes      Starchy vegetables, raw or frozen:  Potatoes  Peas  Corn     AVOID or limit the following foods containing carbohydrates:  Refined sugars, such as in:  Candy  Desserts-ice cream, cakes, pies, brownies, frozen yogurt, sherbet/sorbet  Cookies  White flour: bread/pasta/crackers/rice/tortillas  Sugary snacks: sweetened cereal, granola bars, cereal bars, donuts, muffins, bagels  Sugary Drinks:  Fruit Juice, Smoothies  Sports Drinks  Regular Soda     What are typical serving sizes or portions?  The following are some serving and portion sizes for foods containing carbohydrates:  One medium piece of fruit, about 4?5 ounces (oz) (-tennis ball)  1  cup (C) berries or melon    C canned fruit    C juice (100% vegetable)    C starchy vegetables, cooked or chopped  One slice whole-grain bread  ? C brown rice, quinoa, buckwheat, millet, spelt, faro, kasha    C oatmeal (dry)    C bulgur  One small tortilla (less than 6inch diameter)    C wheat germ  1 oz pretzels     C flaked cereal           Calorie-Controlled Sample Meal Plans     Examples of small healthy meals       Breakfast         Omelet made with   cup to   cup egg substitute or 2 eggs    cup chopped vegetables  1-2 tbsp. of light cheese     cup salsa  Medium banana     1 cup non-fat plain, Greek yogurt mixed with 1 cup berries and 1-2 Tbsp nuts or cereal   -3/4 cup skim or 1% cottage cheese    cup unsweetened whole-grain cereal  1/2 cup of fresh strawberries  Whole-wheat English muffin or mini bagel, 1 scrambled egg and 1 slice Swiss cheese   Small orange  Protein Bar or Shake (15-30 grams protein and 15-25 grams Carbohydrates)    cup cottage cheese, low-fat    cup fresh fruit     11 ounces of Slim Fast Low Carb (only), Juan Ramon's Advantage, EAS Carb Control       Lunch/Dinner    2-3 slices roasted turkey breast  1 tbsp. of fat free mayonnaise  2 slices of  whole-wheat bread, Medium apple  10 baby carrots with 1 tbsp. of low-fat dip     cup water packed tuna or chicken  1 tablespoons of low-fat mayonnaise  1-2 tbsp. dill relish  1 serving of whole-grain crackers  1 cup of strawberries   6 inch turkey sub sandwich with light mayonnaise,   cup cottage cheese                                                                                                                                                      Black bean and low-fat cheese on a whole wheat tortilla with salsa and light sour cream  Grilled chicken sandwich  Tossed salad with light dressing    Baked potato with 3/4 cup of extra lean ground beef, light shredded cheese and salsa  Fresh fruit                                                 Chicken chunks  with lettuce and vegetables stuffed in nitin  Steamed broccoli                                                 3 oz boneless/skinless chicken breast  1/2 cup brown rice with stir-fried vegetables    grapefruit  3 ounces of salmon, trout, or tuna  1 cup of steamed asparagus  1 small slice whole grain Italian bread  Broiled white or pink fish  3/4 cup whole wheat pasta with tomatoes  3/4 cup of roasted red peppers  3 oz. of extra lean (93/7) hamburger on a Arnold's [place] Thins  Tossed salad with light dressing                         Black bean or Tuscan bean soup with grated mozzarella cheese    of a flour tortilla     3 ounces of grilled pork loin with 1 tbsp. of low-sugar barbeque sauce, 1 cup of green beans seasoned with pepper  Small dinner roll or   cup of grapefruit sections     1-2 cups of torn flaco    cup of garbanzo beans or diced skinless chicken breast  5-6 cherry tomatoes  1  tbsp. of crumbled feta cheese  1 tbsp. of roasted soy nuts  1 tsp. of olive oil and 2-3 Tbsp. of balsamic or red wine vinegar  Small whole-wheat dinner roll or   cup of cut up pineapple     Jaime Seed Pudding:     Mix jaime seeds with your choice of milk (almond, coconut, or regular milk).  Add a scoop of protein powder for extra protein.  Let it sit in the fridge overnight to thicken.  Serve with sliced fruits, nuts, or seeds on top.  Greek Yogurt Bowl:     Greek yogurt (plain or flavored).  Top with sliced bananas, berries, and a sprinkle of nuts or seeds (almonds, walnuts, jaime seeds, or hemp seeds).  Optional: drizzle with honey or maple syrup for sweetness.     Protein Smoothie:     Blend together a combination of protein-rich ingredients such as spinach, kale, almond milk, tofu, Greek yogurt, nut butter (peanut butter, almond butter), and a scoop of protein powder.  Add frozen berries or banana for sweetness.  Optional: add jaime seeds or flaxseeds for extra fiber and protein.     Vegan Protein Pancakes:     Mix together  mashed bananas, oat flour, plant-based protein powder, and almond milk until you get a pancake batter consistency.  Cook on a non-stick pan with coconut oil or cooking spray.  Serve with sliced fruits, nut butter, or a drizzle of maple syrup.     Quinoa Breakfast Bowl:     Cooked quinoa mixed with almond milk, chopped nuts (almonds, walnuts), seeds (ana seeds, hemp seeds), and dried fruits (raisins, cranberries).  [place] with a touch of maple syrup or agave nectar.     Tofu Scramble:     Crumble extra-firm tofu into a skillet and cook with chopped vegetables (bell peppers, onions, spinach) and spices (turmeric, cumin, paprika, salt, and pepper).  Serve with whole-grain toast or tortillas.     Plant-Based Protein Overnight Oats:     Mix rolled oats with your choice of plant-based milk (almond, coconut, soy).  Add a scoop of plant-based protein powder and mix well.  Let it sit in the fridge overnight.  Serve with sliced fruits, nuts, or seeds on top.  Nutty Granola Bowl:     Mix together your favorite nuts (almonds, walnuts, pecans), seeds (pumpkin seeds, sunflower seeds), and dried fruits (cranberries, apricots).  Serve with plant-based yogurt and a drizzle of maple syrup.        Protein Supplements          Type Serving Calories Protein Grams Sugar Grams Where Available   Muscle Milk Pro Series Shake 1 bottle  170 32 1 [facility], [employer]   Premier Protein Shake 1 bottle 160 30 1 [employer]  [facility], [employer], [employer]   Equate High Performance Shake 1 bottle  160 30 1 [facility]   [employer] Nutrition Plan Shake 1 bottle 150 30 2 [employer], [facility]   Ensure Max Protein  Shake 1 bottle 150 30 1 [facility], Target   [employer] Core Power Shake 1 bottle 170 26 5 [facility]   Pure Protein Shake 1 bottle 110-170 23 1  [employer], [facility], Target   Slim Fast   High Protein Shake 1 bottle 180 20 1 [facility], Target, Grocery/Pharmacy   100kcal Muscle Milk Shake 1 bottle 100 20 0 [facility], Target, [employer],Grocery/Pharmacy   Juan Ramon's  Lift Water 1 bottle 90 20 0 [employer], Target   Isopure Zero Carb Water   bottle 80 20 0 [employer], Vitamin Shoppe, online   Premier Protein Clear Water 1 bottle 90 20 0 [facility]    Unjury Protein+ Powder 1 scoop 90 20 0 www.[employer])  www.[facility] [facility]   Advantage Shake  1 bottle 160 15-17 1 [facility], Target  Grocery/Pharmacy   100kcal Muscle Milk Powder 1 scoop 100 15 0 [facility], musclemiInfusion Resource.com   Protein 2-0 Water 1 bottle 60-70 15 0 [facility]            Plant-Based Protein Supplements       Type Serving  Calories Protein   Grams Sugar Grams  Where Available    Garden of Life Raw Protein Powder Powder 1 scoop 110 22 0 Whole Foods, Vitamin Shoppe, www.[employer]   Orgain Organic Protein Powder Powder 2 scoops 150 21 0 Target, [facility], [employer]   Planted by Unjury Powder 1 scoop 130 20 3 www.[employer] ([employer])  www.amazon.com   Protein and Greens by [place] Powder  1 scoop 120 20 0 [facility], Target, Grocery/Pharmacy   Essentials Shake by [place] Powder 1 scoop 130 20 0 [facility], Target, Grocery/Pharmacy      Plant Fusion Orgain Plant Protein Powder 1 scoop 120 20 0 www.plantfusion.Whimseybox   Orgain Grass Fed Shake Shake 1 bottle 140 20 4 Target, [facility]   Evolve Shake 1 bottle 150 20 4 Target, [place], Wal[employer]Mart   Sun Warrior Powder 1 scoop 100 17-18 0 [employer], Whole Foods   Whole Foods Fit Protein  Powder 1 scoop 110 17 0 Whole Foods   Garden of Life Plant Protein  Powder 1 scoop 90 15 0 Whole Foods, Vitamin Shoppe,   www.amazon.com      Look for (per serving):  -100-200 calories  -15-30 grams protein   -Less than 10 grams total carbohydrate               After Visit Summary (Automatic SnapShot taken 3/5/2024)  Additional Documentation    Encounter Info: Billing Info,     History,     Allergies,     Detailed Report     Communications      Letter sent to Sarah Brito MD  AVS Reports    Date/Time Report Action User   3/5/2024  1:34 PM After Visit Summary Automatically Generated Karina Gaxiola RD     Encounter Information    Encounter  Information   Provider Department Encounter # Center   3/5/2024 1:00 PM Karina Gaxiola RD [facility] [id number] [facility]     Reviewed this Encounter    None     Communicable/Travel screen     important suggestion  No data to display     Orders Placed    MNT INDIVIDUAL INITIAL EA 15 MIN  Medication Changes      None  Medication List  Visit Diagnoses      Obesity, Class III, BMI 40-49.9 (morbid obesity) (H)    Nutritional counseling  Problem List

## 2024-08-27 NOTE — PATIENT INSTRUCTIONS
I would ask you GI doctor to test for H. Pylori - that is commonly a breathing test - this may be causing some of the GI symptoms as well. I think the NSAID use may have caused his new GI issue as well. Hopefully they will be able to prescribe a medication to help with stomach acid and/or coat/protect the stomach lining further.

## 2024-08-27 NOTE — TELEPHONE ENCOUNTER
Retail Pharmacy Prior Authorization Team   Phone: [phone]    MEDICATION APPEAL APPROVED    Medication: WEGOVY 1 MG/0.5ML SC SOAJ  Authorization Effective Date: 8/13/2024  Authorization Expiration Date: 3/27/2025  Reference #: [id number]    [employer] Company: [employer]  Filling Pharmacy: [address]  Patient Notified: YES  Comments:

## 2024-10-18 PROBLEM — J45.909 ASTHMA: Status: ACTIVE | Noted: 2024-10-18

## 2024-10-18 PROBLEM — G47.11 IDIOPATHIC HYPERSOMNIA: Status: ACTIVE | Noted: 2024-10-18

## 2024-10-21 ENCOUNTER — OFFICE VISIT (OUTPATIENT)
Dept: SURGERY | Facility: CLINIC | Age: 44
End: 2024-10-21
Payer: COMMERCIAL

## 2024-10-21 VITALS
DIASTOLIC BLOOD PRESSURE: 80 MMHG | SYSTOLIC BLOOD PRESSURE: 124 MMHG | BODY MASS INDEX: 42.5 KG/M2 | HEIGHT: 67 IN | WEIGHT: 270.8 LBS

## 2024-10-21 DIAGNOSIS — E66.01 CLASS 3 SEVERE OBESITY DUE TO EXCESS CALORIES WITH BODY MASS INDEX (BMI) OF 40.0 TO 44.9 IN ADULT, UNSPECIFIED WHETHER SERIOUS COMORBIDITY PRESENT (H): Primary | ICD-10-CM

## 2024-10-21 DIAGNOSIS — E66.813 CLASS 3 SEVERE OBESITY DUE TO EXCESS CALORIES WITH BODY MASS INDEX (BMI) OF 40.0 TO 44.9 IN ADULT, UNSPECIFIED WHETHER SERIOUS COMORBIDITY PRESENT (H): Primary | ICD-10-CM

## 2024-10-21 PROCEDURE — 99213 OFFICE O/P EST LOW 20 MIN: CPT | Performed by: EMERGENCY MEDICINE

## 2024-10-21 RX ORDER — OMEPRAZOLE 40 MG/1
CAPSULE, DELAYED RELEASE ORAL
COMMUNITY
Start: 2024-07-22

## 2024-10-21 NOTE — LETTER
10/21/2024      Melissa Jay  [address]  [facility] [address]      Dear Colleague,    Thank you for referring your patient, Melissa Jay, to the [facility] [place]. Please see a copy of my visit note below.    Bariatric Clinic Follow-Up Visit:    Melissa Jay is a 44 year old  female with Body mass index is 42.41 kg/m .  presenting here today for follow-up on non-surgical efforts for weight loss. Original Intake visit occurred on 3/4/24 with a weight of 279lbs and BMI of 43.7.  Along with diet and behavior changes, she has been using Wegovy (Her Adderall for ADD has some appetite suppression effects as well) to assist her weight loss goals.  Her insurance requires Wegovy trial prior to considering Zepbound.  Wegovy was prescribed in April 2024 but she arrives at her 6/17/24 visit stating her pharmacy hasn't had any and weight is essentially neutral to slightly up (282 lbs).  Re-Ramping prescribed at that visit and up to 2.4mg/week dose as of our 10/21/24 visit.  Down 12 lbs since starting regular use.    See her intake visit notes for details on identified contributors to weight gain in the past. Chart review shows Dietician calculated RMR of 1950kcal/day and protein intake goal of 70-90g/day.     Weight:   Wt Readings from Last 5 Encounters:   10/21/24 122.8 kg (270 lb 12.8 oz)   08/26/24 125.9 kg (277 lb 9.6 oz)   06/17/24 127.9 kg (282 lb)   03/04/24 126.6 kg (279 lb)   12/08/23 128.8 kg (284 lb)    pounds      Comorbidities:  Patient Active Problem List   Diagnosis     Obesity     RW ALLERGIES/IMMUNOLOGY (ABSTRACTED)     Mild intermittent asthma     Depressive disorder, not elsewhere classified     allergic rhinnitis     Backache     Myalgia and myositis     Narcolepsy without cataplexy(347.00)     Other specified aftercare following surgery     Routine postpartum follow-up     CARDIOVASCULAR SCREENING; LDL GOAL LESS THAN 160     Dyspnea on exertion      Dermatitis herpetiformis     IBS (irritable bowel syndrome)     Overweight     Uterine leiomyoma     Asthma     Idiopathic hypersomnia       Current Outpatient Medications:      betamethasone dipropionate (DIPROSONE) 0.05 % external cream, Apply topically every 24 hours, Disp: , Rfl:      celecoxib (CELEBREX) 100 MG capsule, , Disp: , Rfl:      omeprazole (PRILOSEC) 40 MG DR capsule, , Disp: , Rfl:      Semaglutide-Weight Management (WEGOVY) 2.4 MG/0.75ML pen, Inject 2.4 mg Subcutaneous every 7 days for 240 days 4 pens, Disp: 3 mL, Rfl: 9      Interim: Since our last visit, she has finally been able to ramp up her Wegovy. Finds it helpful but can struggle to meal prep due to back/neck pain issues. Reviewed her routine:  Started omeprazole recently post endoscopy. Had been feeding discomfort previously.   Wasn't tolerating dairy well. Started some collagen powder to her meals.   Focused on protein first approach.     Plan:   1.  Diet: aiming for 1700kcal/day with 70-90 grams of lean protein daily (max 120g/grams). Continue  beverages from meals.     2. Exercise: aiming for walks.   3. Medication: Wegovy 2.4mg/week tolerated. Continue if tolerated. If early plateau or intolerance we can consider Zepbound as an alternative.     4. No labs needed today.  5. Goals: down 12 lbs since being able to ramp up Wegovy, dropping weight every 7-10 days of 1-2 lbs suggests an ideal routine at this.      We discussed [facility] Bariatric Basics including:  -eating 3 meals daily  -reviewed metabolic needs for weight loss based on Resting Metabolic Rate  -protein goals supportive of healthy weight loss  -avoiding/limiting calorie containing beverages  -We discussed the importance of restorative sleep and stress management in maintaining a healthy weight.  -We discussed the National Weight Control Registry healthy weight maintenance strategies and ways to optimize metabolism.  -We discussed the importance of physical  "activity including cardiovascular and strength training in maintaining a healthier weight and explored viable options.      Most recent labs:  Lab Results   Component Value Date    WBC 5.4 03/04/2024    HGB 13.7 03/04/2024    HCT 40.4 03/04/2024    MCV 86 03/04/2024     03/04/2024     Lab Results   Component Value Date    CHOL 156 10/22/2007     Lab Results   Component Value Date    HDL 43 (L) 10/22/2007     No components found for: \"LDLCALC\"  No results found for: \"TRIG\"  No results found for: \"CHOLHDL\"  Lab Results   Component Value Date    ALT 11 03/04/2024    AST 21 03/04/2024    ALKPHOS 100 03/04/2024     No results found for: \"HGBA1C\"  Lab Results   Component Value Date    B12 447 03/04/2024     No components found for: \"VITDT1\"  No results found for: \"FELIX\"  Lab Results   Component Value Date    PTHI 38 03/04/2024     No results found for: \"ZN\"  No results found for: \"VIB1WB\"  Lab Results   Component Value Date    TSH 1.07 03/04/2024     No results found for: \"TEST\"    DIETARY HISTORY  Can struggle at times to get nourishment in. Working on protein first appraoch      PHYSICAL ACTIVITY PATTERNS:  Cardiovascular: limited  Strength Training: limited     REVIEW OF SYSTEMS  No pain/nausea/constipation issues.  PHYSICAL EXAM:  Vitals: /80   Ht 1.702 m (5' 7\")   Wt 122.8 kg (270 lb 12.8 oz)   BMI 42.41 kg/m    Weight:   Wt Readings from Last 3 Encounters:   10/21/24 122.8 kg (270 lb 12.8 oz)   08/26/24 125.9 kg (277 lb 9.6 oz)   06/17/24 127.9 kg (282 lb)         GEN: Pleasant, well groomed, in no acute distress  HEENT:  normal facies. Stoic affect .  NECK: No swelling.  HEART: .  LUNGS: No respiratory difficulty noted. No cough. .  ABDOMEN: nontender.  EXTREMITIES: No tremor. Ambulation is independent..  NEURO: Alert and Oriented X3, fluent speech. .  SKIN: No visible rashes. .    Interim study results:   TSH   Date Value Ref Range Status   03/04/2024 1.07 0.30 - 4.20 uIU/mL Final   04/12/2007 1.14 " 0.4 - 5.0 mU/L Final     Last Comprehensive Metabolic Panel:  Sodium   Date Value Ref Range Status   03/04/2024 141 135 - 145 mmol/L Final     Comment:     Reference intervals for this test were updated on 09/26/2023 to more accurately reflect our healthy population. There may be differences in the flagging of prior results with similar values performed with this method. Interpretation of those prior results can be made in the context of the updated reference intervals.      Potassium   Date Value Ref Range Status   03/04/2024 3.8 3.4 - 5.3 mmol/L Final     Chloride   Date Value Ref Range Status   03/04/2024 104 98 - 107 mmol/L Final     Carbon Dioxide (CO2)   Date Value Ref Range Status   03/04/2024 24 22 - 29 mmol/L Final     Anion Gap   Date Value Ref Range Status   03/04/2024 13 7 - 15 mmol/L Final     Glucose   Date Value Ref Range Status   03/04/2024 90 70 - 99 mg/dL Final     Urea Nitrogen   Date Value Ref Range Status   03/04/2024 7.4 6.0 - 20.0 mg/dL Final   08/06/2006 10 5 - 24 mg/dL Final     Creatinine   Date Value Ref Range Status   03/04/2024 0.73 0.51 - 0.95 mg/dL Final   08/06/2006 0.67 0.60 - 1.30 mg/dL Final     GFR Estimate   Date Value Ref Range Status   03/04/2024 >90 >60 mL/min/1.73m2 Final     Calcium   Date Value Ref Range Status   03/04/2024 9.3 8.6 - 10.0 mg/dL Final     Bilirubin Total   Date Value Ref Range Status   03/04/2024 0.4 <=1.2 mg/dL Final     Alkaline Phosphatase   Date Value Ref Range Status   03/04/2024 100 40 - 150 U/L Final     Comment:     Reference intervals for this test were updated on 11/14/2023 to more accurately reflect our healthy population. There may be differences in the flagging of prior results with similar values performed with this method. Interpretation of those prior results can be made in the context of the updated reference intervals.     ALT   Date Value Ref Range Status   03/04/2024 11 0 - 50 U/L Final     Comment:     Reference intervals for this test  were updated on 6/12/2023 to more accurately reflect our healthy population. There may be differences in the flagging of prior results with similar values performed with this method. Interpretation of those prior results can be made in the context of the updated reference intervals.     08/06/2006 20 0 - 50 U/L Final     AST   Date Value Ref Range Status   03/04/2024 21 0 - 45 U/L Final     Comment:     Reference intervals for this test were updated on 6/12/2023 to more accurately reflect our healthy population. There may be differences in the flagging of prior results with similar values performed with this method. Interpretation of those prior results can be made in the context of the updated reference intervals.   08/06/2006 25 0 - 45 U/L Final               .      20 minutes spent by me on the date of the encounter doing chart review, history and exam, documentation and further activities per the note   Lars Morales MD  [facility]  8:31 AM  10/21/2024      Again, thank you for allowing me to participate in the care of your patient.        Sincerely,        Lars Morales MD

## 2024-10-21 NOTE — PATIENT INSTRUCTIONS
Plan:   1.  Diet: aiming for 1700kcal/day with 70-90 grams of lean protein daily (max 120g/grams). Continue  beverages from meals.     2. Exercise: aiming for walks.   3. Medication: Wegovy 2.4mg/week tolerated. Continue if tolerated. If early plateau or intolerance we can consider Zepbound as an alternative.     4. No labs needed today.  5. Goals: down 12 lbs since being able to ramp up Wegovy, dropping weight every 7-10 days of 1-2 lbs suggests an ideal routine at this.      Example Meal Plan for a 9631-3566 Calorie Diet:    In order to fuel your weight loss properly and avoid hunger-induced overeating later in the day, for your height and weight, you will enjoy the most success by following the diet below or similar with adjustments based on your particular tastes and preferences.  Exercise may influence speed, amount of weight loss further.     I recommend getting into a meal routine and keeping it similar day to day in the beginning so you don t have to think too hard about what you re going to make/eat.  Keep snacks healthy, ideally containing protein and some vegetables.  Non-processed food is preferable to packaged items.  Eat at least a few crunchy green vegetables if having a snack, which should be 2-3 hours after your mealtimes(prepare these ahead of time for ease of use).  Drink 64 oz -80 oz of water daily for most, some of you will need more and we'll discuss it at your visit if that is the case.      When changing our diet,  we can often mistake thirst for hunger or just have some distracted eating habits that we need to break free from ('bored/mindless eating', screen time,work, driving,etc).  A glass of water and reconsideration of our hunger is often all that is needed.  Having the urge is not the problem, but watching it pass by without acting on it is the goal.    If you re having hunger problems, add a protein drink/snack to your morning hours or afternoon snack with at least 20grams of  protein and not too much sugar (under 10g).  A carton of higher protein/low sugar yogurt can work as well.  If the urge to snack is overwhelming and not satiated, try going for a 10 minute walk/exercise, come home and drink a glass of water and if still hungry, have a  calorie snack (handful of raw/sprouted nuts, veggies and string cheese, protein bar, etc).  Savor it.    It is better to have a large breakfast, a moderate lunch and a smaller dinner to fuel your day.  People lose 10-15% more weight during their weight loss season with this strategy. Optimizing your protein intake at each meal will further keep you more satisfied while eating less food overall.  Getting exercise in early has also been shown to offer the best results (before breakfast ideally but anytime is the right time to exercise if that is not an option for you).    To make sure you re getting adequate vitamins and minerals during weight loss, I recommend one complete multivitamin a day of your choice.  Consider a probiotic and taking some vitamin D 2000 IU daily.    Let supper be your last meal of the day and ideally try to have at least 12 hours between supper and breakfast the next day to tap into some beneficial overnight fasting dynamics.  Midnight snacks need to go away. Water in the evening is fine, unsweetened, non caffeinated herbal tea is helpful as well.  Consolidating your meals within a 8-12 hour period of your day will help tap into these additional metabolic benefits and tends to keep your appetite up for breakfast, further helping to stay on track.  For most of my patients, I don't recommend an intermittent fasting style diet (many find it hard to fit in their lifestyle) but an overnight fast is very doable for most patients and helps regulate our hunger drives a little better.  This makes it very important to nail good intake at all three meals to feel satisfied/energized and still lose weight.      If evening snacking  desires are high, consider a glass of fiber supplement for some additional fullness (metamucil or similar). Most of us don't get the 25-30 grams per day of fiber that promotes good gut health/satiety.  Benefiber, metamucil, citrucel are reasonable/affordable options for most people.  Inulin, chicory, psyllium husk are reasonable options but start slow and low in the dose to avoid gas/bloating until your gut gets acclimated (ramping up to 5-10 grams per day of supplemental fiber after 3-4 weeks if needed).      Example Meal Plan:  Breakfast: 450-475 Calories  1 egg cooked on low in olive oil:   calories.  5oz Greek Yogurt (Fage plain classic: ~150 eris)  Handful of Berries of your choice (about a calorie per berry or 20-40cal per handful)    cup(cooked) of  old fashioned oatmeal or 1/2 cup(cooked) steel cut oats. (150 eris)  Sprinkle amount of brown sugar and a pat of butter. (40 eris)  Glass of  Water  Black coffee or unsweetened Tea (0calories).      2-3 hours Later Snack: (195 calories).  Glass of water  One string Cheese (80 calories) or 4 oz creamed cottage cheese (115 calories) with  Crunchy Celery sticks (less than 10 calories per large stalk) 2 stalks. (20 calories)    of a  Large Banana or   of a Large Apple (60 calories):  eat second half at lunch or afternoon snack.     Lunch:300 -350 calories   Chicken Breast  (baked/broiled/roasted/grilled)  4-6 oz.  (125-180 eris), BBQ sauce/hot sauce/mustard/seasoning is free. Just use a reasonable amount. Or a can of tuna with 1 tablespoon mayonnaise.  Salad: lettuce, any other veggies (cucumbers, green peppers/celery you like and a small drizzle of dressing to just flavor.  Go as big on the veggies as you like,  as they are practically calorie free.   A whole, 8 inch cucumber is 45 calories, a whole green pepper is 23 calories, a stalk of celery is 9 calories.  [place] Dressing is 60 calories per tablespoon..so moderate your desired dressing or do a drizzle  of olive oil and splash of balsamic vinegar on top,  Total calories unlikely to be over 150 even with dressing.  Glass of Water.    Option for lunch is meal replacement protein drink/smoothie.  Need at least 20 grams of protein and eat the rest of your apple/banana from the morning snack.      Afternoon Snack: 150-200 calories   Cheese Stick or cottage cheese again  and a fresh fruit OR  Granola Bar (protein Bar acceptable if under 200 calories OR  Homemade smoothies:  8oz skim milk,  a handful of berries (fresh or frozen and a serving of protein powder such as BiPro or Ginny sWhey for example.  If you don't like dairy, make with 8oz water, one small banana, handful of berries and the protein powder, add any veggies you want as well:  roughly 200 calories.   Glass of Water    Dinner: 325 calories  4oz of fresh, [place] salmon.  Broiled (salt/pepper/dill) for about 8-8.5 minutes (200calories) or  4oz filet mignon steak or sirloin steak  Salad or vegetable sautéed lightly in olive oil or   Broccoli 1.5  cups chopped and steamed  or micro-waved in a little water (75 calories)  Glass of Water,    Cup of herbal tea (unsweetened, caffeine free)      Herbs and seasonings are encouraged to flavor your foods/vegetables.  Make your food delicious.      Tips for Success:  1.  Prepare proteins ahead of time (broil chicken breasts in bulk so you can grab and go), steel cut oats/lentils can be stored in casserole dish/bowl in the fridge for quick scoop in the morning and rewarm in microwave, make use of crock pot recipes (watch salt content).  Making meals that cover 3-4 future meals is an easy way to stay on track.  2.  Drink a 8-12 oz glass of water every 2-3 hours when awake.  We often mistake hunger for thirst, especially when losing weight.  3. Remember your Reward and Motivation when things get hard.  4.  Weigh yourself every morning and record, you'll stay on track better and learn how our biorhythms, diet and elimination  "patterns show up on the scale. Don't worry about 1 or 2 day patterns, but when on track you'll notice good trend downward of weight over 3-4 day segments.  Plateaus tend to resolve after 4-8 days in most cases if you stay consistent with your plan.  These are natural and part of weight loss, even if you're perfect with your plan execution.  5. Call if problems/concerns.  [employer] is a great tool to stay in touch and provide weekly outside accountability. Check in with questions or if you want to brag.  6.  Find a handful of meals/foods that keep you on track and feeling good and get into a routine that is sustainable for you.  It's OK to have a routine that works for you.  7.  Consider taking a complete multivitamin just to make sure all micronutrients are adequate during weight loss.  8. If losing hair/brittle nails it usually means you are not taking enough protein.  Minimum goal is 60 grams daily of protein for smaller women, 80 grams a day for men. Consider taking Biotin as supplement or a \"Hair and Nail\" multivitamin.    LEAN PROTEIN SOURCES  Getting 20-30 grams of protein, 3 meals daily, is appropriate for most people, some need more but more than about 40 grams per meal is not useful.  General rule is drinking one ounce of water per gram of protein eaten over the course of the day:  70 grams of protein each day, drink 70 oz of water.  Protein Source Portion Calories Grams of Protein                           Nonfat, plain Greek yogurt    (10 grams sugar or less) 3/4 cup (6 oz)  12-17   Light Yogurt (10 grams sugar or less) 3/4 cup (6 oz)  6-8   Protein Shake 1 shake 110-180 15-30   Skim/1% Milk or lactose-free milk 1 cup ( 8 oz)  8   Plain or light, flavored soymilk 1 cup  7-8   Plain or light, hemp milk 1 cup 110 6   Fat Free or 1% Cottage Cheese 1/2 cup 90 15   Part skim ricotta cheese 1/2 cup 100 14   Part skim or reduced fat cheese slices 1 ounce 65-80 8     Mozzarella String Cheese " 1 80 8   Canned tuna, chicken, crab or salmon  (canned in water)  1/2 cup 100 15-20   White fish (broiled, grilled, baked) 3 ounces 100 21   [place]/Tuna (broiled, grilled, baked) 3 ounces 150-180 21   Shrimp, Scallops, Lobster, Crab 3 ounces 100 21   Pork loin, Pork Tenderloin 3 ounces 150 21   Boneless, skinless chicken /turkey breast                          (broiled, grilled, baked) 3 ounces 120 21   [place], [place], [place], and Venison 3 ounces 120 21   Lean cuts of red meat and pork (sirloin,   round, tenderloin, flank, ground 93%-96%) 3 ounces 170 21   Lean or Extra Lean Ground Turkey 1/2 cup 150 20   90-95% Lean Carver Burger 1 jessica 140-180 21   Low-fat casserole with lean meat 3/4 cup 200 17   Luncheon Meats                                                        (turkey, lean ham, roast beef, chicken) 3 ounces 100 21   Egg (boiled, poached, scrambled) 1 Egg 60 7   Egg Substitute 1/2 cup 70 10   Nuts (limit to 1 serving per day)  3 Tbsp. 150 7   Nut [place] (peanut, almond)  Limit to 1 serving or less daily 1 Tbsp. 90 4   Soy Burger (varies) 1  15   Garbanzo, Black, Ivory Beans 1/2 cup 110 7   Refried Beans 1/2 cup 100 7   Kidney and Lima beans 1/2 cup 110 7   Tempeh 3 oz 175 18   Vegan crumbles 1/2 cup 100 14   Tofu 1/2 cup 110 14   Chili (beans and extra lean beef or turkey) 1 cup 200 23   Lentil Stew/Soup 1 cup 150 12   Black Bean Soup 1 cup 175 12         Wegovy (semaglutide) is a very effective satiety boosting appetite suppressant. It needs to be ramped up slowly to be tolerated adequately.  About 1/10 people will not tolerate this medication. Each month, you move up to a higher dose until eventually reaching the 2.4mg/week dose over 5 months, if tolerating the ramping process well. When in plentiful supply, one try at re-ramping is recommended if the initial ramping has too many side effects/isn't tolerated well and if that attempt at  re-ramping isn't tolerated well, it's best to find an  alternative.       Wegovy starts at 0.25mg/week for 4 weeks then increases to 0.5mg/week for 4 weeks then 1mg/week for 4 weeks then 1.7mg/week for 4 weeksthen 2.4mg/week usually for 6-9 months if tolerated well.  Injections can be given after cleansing the skin with alcohol prep pad or swab (available OTC).  Warming to room temperature 20-60 minutes prior to injection by placing on a table/counter reduces potential irritation at the time of injection.    Stop Wegovy if severe abdominal pain/vomiting/rash/throat swelling or constant nausea that prevents adequate food/water intake. Stop 2-3 weeks prior to any planned general anesthesia surgeries to reduce risk for something called a post operative ileus. If unexpected illness/injury that requires surgery, plan to go off Wegovy until fully recovered.    Gallstones can occur in about 1% of patients on this medication so update me if increase right upper abdominal pain after eating.     Start meals with protein first, separate beverages from meals by 20 minutes and work hard in between meals to get your 64-75 oz of water daily to reduce risks for severe constipation. Consider a fiber supplement like powdered psyllium husk in 12 oz water each night.    For Prevention and Treatment of Constipation when on Semaglutide     From least aggressive to most aggressive:     Move: [place] is essential - the more we move, the more our bowels move  Water: Drink water - 64oz-80oz per day suits most people well. About an one ounce of water per gram of protein eaten.  Go when you need to go. Don't wait. The longer you wait, the harder it gets.  Fiber: Fruit, raw veggies, nuts, whole grains, prune each night, flax/ana seeds added into meals can all be helpful.   Stool Softeners: if constipation is mild and for maintenance  Gentle laxatives: Miralax, senokot, dulcolax , Smooth move tea as needed     More aggressive (and typically won't get to this point)  Milk of Magnesia to empty out.  Don't go anywhere far from a toilet for 6 hours.  Mag Citrate (what you drink before a colonoscopy).   Suppositories  Enema      Check out [place] [facility] for patient resources.      If you have weekends off, I recommend dosing Thursday or Friday evenings for best control over weekends and ability to ride out some transient side effects should they occur.    Some people starve on this medication if not mindful about food intake. I recommend starting meals with the protein part of your meal first, chew thoroughly and separate beverages from meals by about 20 minutes to make sure you get your nourishment in first. Include vegetables/complex carbohydrates and unsaturated fat as part of your balanced diet but group these at the end of the meal, after protein is mostly gone. Satiety will kick in too early if drinking too much with meals and under nourishment can result.  If under nourished, more muscle mass losses and metabolic slowing will result and work against us in the long run.    It's not a bad idea to take a complete multivitamin most days of the week if using this medication. Low Iron formulas may be less constipating.    Pancreatitis is a very rare but potentially serious side effect. Stop Wegovy if severe mid abdominal pain/burning in nature or if unable to eat/drink due to severe nausea/discomfort.   People with strong history of pancreatitis without clear cause should stay clear of this medication as should those with strong personal or family history for medullary thyroid cancer or Multiple Endocrine Neoplasia (rare).     Kind Regards,  Lars Morales MD  [facility]  On-the-Go Breakfast Ideas  As of 2015, the latest research shows what a huge impact eating breakfast has on losing weight and feeling your best. People lose more weight when they make breakfast their biggest meal of the day compared to Dinner, but even if you cannot go to that degree, getting a breakfast that  has at least 20 grams of protein and even a moderate amount of fat is ideal for maintaining good energy through the day and limits overeating in the evening hours.  The following are some quick and easy suggestions for at least getting something of substance into your body in the morning.  Enjoy!    Eating breakfast within 90 minutes of waking up is an important part of taking care of your body on a restricted calorie diet plan.  After sleeping for hours, your body is in need of fuel.  An ideal breakfast is a combination of protein, whole-grain carbohydrates, or fruit.  Here s why:    -Protein digests very slowly in the body, helping you feel more satisfied.  -Whole grains provide dietary fiber, which also digests slowly and helps keep your gut clean.  -Fruit is a great source of vitamins, minerals, and fiber.     Each one of these breakfast combinations has between 200-300 calories and 15-20 grams of protein.  Feel free to mix and match!    Bone Broth (chicken bone broth or beef bone broth) is a great way to boost protein content. 8oz of bone broth will typically have 9-12grams of protein for 40kcal of energy.    Protein: Choose  -1/2 cup low-fat cottage cheese  -2 hard boiled eggs , or one cooked in olive oil (low/slow heat).  -1 low fat string cheese stick  -1 Teknovuson natural peanut butter  -IncentOne vegetarian sausage jessica (found in freezer section)  -1 slice lowfat cheese  -6 oz 2% or lowfat Greek yogurt, such as Fage or Oikos.    PLUS    Whole Grains:  Choose   -1 whole wheat English muffin  -1 whole wheat nitin, half  -1/2 Fiber One frozen muffin, thawed  -1/2 Fiber One toaster pastry  -1 whole wheat bagel thin  -1/2 cup Kashi cereal  -1 Kashi waffle (or other whole grain high-fiber waffle)  Aim for whole grain/sprouted breads with at least 3g of fiber/slice if having bread. [place] is one such brand.    OR    Fruit: Choose  -1/3 cup blueberries  -1/2 banana (or a plantain- similar to a  banana, yet smaller)  -1/2 cup cantaloupe cubes  -1 small apple  -1 small orange  -1/2 cup strawberries  -handful raspberries/blackberries (each berry is about 1 calorie).    *Adapted from [employer], Fall 20    Ten Breakfasts Under 250 calories    Ideally, getting between 350-600 calories  (depending on starting height and weight)for breakfast is ideal for avoiding hunger later in the day, adjust/add to the following accordingly:    One- 250 calories, 8.5 g protein  1 slice whole-grain toast   1 Tbsp peanut butter    banana    Two- 250 calories, 8 g protein    cup nonfat/lowfat yogurt  1/3rd cup diced no-sugar peaches  1/3rd cup cereal (like Special K, Cheerios, or bran flakes)    Three- 250 calories, 25 g protein  1 egg scrambled with 1 oz skim milk    cup shredded cheddar    whole grain English muffin  1 oz [place] muñoz  1 tsp margarine spread    Four- 225 calories, 25 g protein  1/2 cup Kashi Go-Lean cereal    cup skim milk mixed with 1 scoop Bariatric Advantage protein powder    cup no-sugar diced pears    Five- 250 calories, 20 g protein    cup oatmeal prepared with skim milk, 1 scoop protein powder, and sugar-free maple syrup    Six- 200 calories, 5 g protein  1 whole grain waffle, toasted  1 tablespoon creamy peanut or almond butter    Seven-  250 calories, 19 g protein  Breakfast sandwich: 1 slice whole grain toast, cut in half.  Add 1 scrambled egg and one slice cheddar  cheese.    Eight-  250 calories, 15 g protein  2 eggs scrambled with 1/3 cup frozen spinach (heat before adding to eggs) and 2 tablespoons low fat cream cheese.    Nine-  150 calories, 15 g protein  2/3rd cup cottage cheese    cup cantaloupe    Ten- 200 calories, 20 g protein  Fruit smoothie made with 4 oz. nonfat Greek yogurt,   cup berries, 1 scoop protein powder, and 4 oz skim milk.    Ten Lunches Under 250 Calories    Aim for lunch to be around 300-400 calories a day when trying to lose weight and get that protein in!    One-  200 calories, 11 g protein  1/3 cup tuna salad made with light conner on 1 slice whole grain bread  1 small peeled apple    Two- 250 calories, 16 g protein  1/3 cup lowfat cottage cheese    cup cooked green beans    small fruit cocktail (in natural juice)    Three- 200 calories, 11 g protein    grilled cheese sandwich on whole grain bread with lowfat cheese  2/3rd cup of tomato soup    Four- 250 calories, 22 g protein  Deli wrap: 1 oz sliced turkey, 1 oz sliced ham, 1 oz sliced chicken rolled up with 1 slice low-fat cheese  1 small orange    Five- 250 calories, 28 g protein  2/3rd cup chili with 1 oz shredded cheese  4 saltine crackers    Six- 250 calories, 22 g protein  1 cup fresh spinach with 2 oz chicken, 1/3rd cup mandarin oranges, and 2 tablespoons sliced almonds with 1 tablespoon  vinaigrette dressing    Seven- 200 calories, 11 g protein  1 Tbsp sugar-free preserves and 1 Tbsp peanut butter on 1 slice whole grain toast    cup nonfat/lowfat Greek yogurt    Eight- 250 calories, 18 g protein  1 small soft-shell chicken taco with 1 oz shredded cheese, lettuce, tomato, salsa, and 1 Tbsp light sour cream    cup black beans    Nine- 225 calories, 13 g protein  2 ounces baked chicken  1/4 cup mashed potatoes    cup green beans    Ten- 200 calories, 21 g protein  Deli nitin: 2 oz roast beef or other deli meat with 1 tsp [place] mayonnaise and sliced tomato, onion, and lettuce  1/3rd cup cottage cheese      Ten Dinners Under 300 calories    If you're eating a large breakfast and medium lunch, keep dinner small.  300-400 calories is ideal for most people depending on their caloric needs.    One- 300 calories, 12 g protein  1-inch thick slice of turkey meatloaf    cup baked butternut squash    Two- 200 calories, 9 g protein  Bread-less BLT: 3 slices turkey muñoz, sliced tomato, wrapped in a large lettuce leaf    cup peeled fruit    Three- 275 calories, 36 g protein  3 oz roasted chicken    cup cooked broccoli    cup shredded  cheddar cheese    cup unsweetened applesauce    Four- 200 calories, 25 g protein  3 oz baked tilapia  1/3rd cup cooked carrots    cup yogurt    Five- 250 calories, 20 g protein  Grilled ham  n  Swiss: spread 2 tsp ghee or butter on 1 slice of whole grain bread.  Cut bread in half, layer 2 oz deli ham with 1 piece of Swiss cheese and grill until cheese is melted.    cup cooked vegetables    Six- 250 calories, 18 g protein  Vegetarian cheeseburger: 1 [place] cheeseburger topped with lettuce, onion, tomato, and ketchup/mustard    cup sweet potato fries    Seven- 250 calories, 18 g protein  Pork pot roast: 2 oz roasted pork loin, 1/3rd cup roasted carrots,   medium potato, cooked with   cup gravy    Eight- 330 calories, 25 g protein  2 oz meatballs (about 2 small meatballs)    cup spaghetti sauce  1/2 piece toast topped with 1 tsp ghee or butterand topped with garlic powder, toasted in oven    Nine- 250 calories, 16 g protein  Mexican pizza: one 8  corn tortilla topped with 2 oz chicken,   cup salsa, 2 tablespoons black beans, 2 tablespoons shredded cheese.  Bake until cheese is melted.    Ten- 250 calories, 22 g protein  Shrimp stir-razo: 3 oz cooked shrimp, 1/6th onion,   pepper,   cup chopped carrots sautéed in 1 tablespoon olive oil, topped with 2 tablespoons stir razo sauce and a pinch of sesame seeds        150 Calories or Less Snack Ideas   1 hardboiled egg with   cup berries  1 small apple with 1 hardboiled egg  10 almonds with   cup berries  2 clementines with 1 light string cheese  1 light string cheese with   sliced apple  1 light string cheese wrapped in 2 slices of turkey  4 100% whole wheat crackers (e.g. Triscuit) with 1 light string cheese    c. cottage cheese with   cup fruit and 1 Tbsp sunflower seeds     cup cottage cheese with   of an avocado     can tuna fish with 1 cup sliced cucumbers     cup roasted garbanzo beans with paprika and cayenne pepper    baked sweet potato with   cup chili beans or   cup  cottage cheese  2 oz. nitrate free turkey slices with 1 cup carrots  1 container (6 oz) of low sugar (less than 10 grams of sugar) greek yogurt   3 Tablespoons of hummus with 1 cup sliced bell peppers   2 Tablespoons of hummus with 15 baby carrots  4 Tablespoons ranch dip made with plain Greek Yogurt and 3 mini cucumbers  1/4 cup nuts (any kind)  1 Tablespoon peanut butter with 1 stalk celery   1 dill pickle wrapped in 1-2 slices of deli ham with 1 tsp of mayonnaise/mustard.

## 2024-10-21 NOTE — PROGRESS NOTES
Bariatric Clinic Follow-Up Visit:    Melissa Jay is a 44 year old  female with Body mass index is 42.41 kg/m .  presenting here today for follow-up on non-surgical efforts for weight loss. Original Intake visit occurred on 3/4/24 with a weight of 279lbs and BMI of 43.7.  Along with diet and behavior changes, she has been using Wegovy (Her Adderall for ADD has some appetite suppression effects as well) to assist her weight loss goals.  Her insurance requires Wegovy trial prior to considering Zepbound.  Wegovy was prescribed in April 2024 but she arrives at her 6/17/24 visit stating her pharmacy hasn't had any and weight is essentially neutral to slightly up (282 lbs).  Re-Ramping prescribed at that visit and up to 2.4mg/week dose as of our 10/21/24 visit.  Down 12 lbs since starting regular use.    See her intake visit notes for details on identified contributors to weight gain in the past. Chart review shows Dietician calculated RMR of 1950kcal/day and protein intake goal of 70-90g/day.     Weight:   Wt Readings from Last 5 Encounters:   10/21/24 122.8 kg (270 lb 12.8 oz)   08/26/24 125.9 kg (277 lb 9.6 oz)   06/17/24 127.9 kg (282 lb)   03/04/24 126.6 kg (279 lb)   12/08/23 128.8 kg (284 lb)    pounds      Comorbidities:  Patient Active Problem List   Diagnosis    Obesity    RW ALLERGIES/IMMUNOLOGY (ABSTRACTED)    Mild intermittent asthma    Depressive disorder, not elsewhere classified    allergic rhinnitis    Backache    Myalgia and myositis    Narcolepsy without cataplexy(347.00)    Other specified aftercare following surgery    Routine postpartum follow-up    CARDIOVASCULAR SCREENING; LDL GOAL LESS THAN 160    Dyspnea on exertion    Dermatitis herpetiformis    IBS (irritable bowel syndrome)    Overweight    Uterine leiomyoma    Asthma    Idiopathic hypersomnia       Current Outpatient Medications:     betamethasone dipropionate (DIPROSONE) 0.05 % external cream, Apply topically every 24 hours, Disp: , Rfl:      celecoxib (CELEBREX) 100 MG capsule, , Disp: , Rfl:     omeprazole (PRILOSEC) 40 MG DR capsule, , Disp: , Rfl:     Semaglutide-Weight Management (WEGOVY) 2.4 MG/0.75ML pen, Inject 2.4 mg Subcutaneous every 7 days for 240 days 4 pens, Disp: 3 mL, Rfl: 9      Interim: Since our last visit, she has finally been able to ramp up her Wegovy. Finds it helpful but can struggle to meal prep due to back/neck pain issues. Reviewed her routine:  Started omeprazole recently post endoscopy. Had been feeding discomfort previously.   Wasn't tolerating dairy well. Started some collagen powder to her meals.   Focused on protein first approach.     Plan:   1.  Diet: aiming for 1700kcal/day with 70-90 grams of lean protein daily (max 120g/grams). Continue  beverages from meals.     2. Exercise: aiming for walks.   3. Medication: Wegovy 2.4mg/week tolerated. Continue if tolerated. If early plateau or intolerance we can consider Zepbound as an alternative.     4. No labs needed today.  5. Goals: down 12 lbs since being able to ramp up Wegovy, dropping weight every 7-10 days of 1-2 lbs suggests an ideal routine at this.      We discussed [facility] Bariatric Basics including:  -eating 3 meals daily  -reviewed metabolic needs for weight loss based on Resting Metabolic Rate  -protein goals supportive of healthy weight loss  -avoiding/limiting calorie containing beverages  -We discussed the importance of restorative sleep and stress management in maintaining a healthy weight.  -We discussed the National Weight Control Registry healthy weight maintenance strategies and ways to optimize metabolism.  -We discussed the importance of physical activity including cardiovascular and strength training in maintaining a healthier weight and explored viable options.      Most recent labs:  Lab Results   Component Value Date    WBC 5.4 03/04/2024    HGB 13.7 03/04/2024    HCT 40.4 03/04/2024    MCV 86 03/04/2024     03/04/2024  "    Lab Results   Component Value Date    CHOL 156 10/22/2007     Lab Results   Component Value Date    HDL 43 (L) 10/22/2007     No components found for: \"LDLCALC\"  No results found for: \"TRIG\"  No results found for: \"CHOLHDL\"  Lab Results   Component Value Date    ALT 11 03/04/2024    AST 21 03/04/2024    ALKPHOS 100 03/04/2024     No results found for: \"HGBA1C\"  Lab Results   Component Value Date    B12 447 03/04/2024     No components found for: \"VITDT1\"  No results found for: \"FELIX\"  Lab Results   Component Value Date    PTHI 38 03/04/2024     No results found for: \"ZN\"  No results found for: \"VIB1WB\"  Lab Results   Component Value Date    TSH 1.07 03/04/2024     No results found for: \"TEST\"    DIETARY HISTORY  Can struggle at times to get nourishment in. Working on protein first appraoch      PHYSICAL ACTIVITY PATTERNS:  Cardiovascular: limited  Strength Training: limited     REVIEW OF SYSTEMS  No pain/nausea/constipation issues.  PHYSICAL EXAM:  Vitals: /80   Ht 1.702 m (5' 7\")   Wt 122.8 kg (270 lb 12.8 oz)   BMI 42.41 kg/m    Weight:   Wt Readings from Last 3 Encounters:   10/21/24 122.8 kg (270 lb 12.8 oz)   08/26/24 125.9 kg (277 lb 9.6 oz)   06/17/24 127.9 kg (282 lb)         GEN: Pleasant, well groomed, in no acute distress  HEENT:  normal facies. Stoic affect .  NECK: No swelling.  HEART: .  LUNGS: No respiratory difficulty noted. No cough. .  ABDOMEN: nontender.  EXTREMITIES: No tremor. Ambulation is independent..  NEURO: Alert and Oriented X3, fluent speech. .  SKIN: No visible rashes. .    Interim study results:   TSH   Date Value Ref Range Status   03/04/2024 1.07 0.30 - 4.20 uIU/mL Final   04/12/2007 1.14 0.4 - 5.0 mU/L Final     Last Comprehensive Metabolic Panel:  Sodium   Date Value Ref Range Status   03/04/2024 141 135 - 145 mmol/L Final     Comment:     Reference intervals for this test were updated on 09/26/2023 to more accurately reflect our healthy population. There may be " differences in the flagging of prior results with similar values performed with this method. Interpretation of those prior results can be made in the context of the updated reference intervals.      Potassium   Date Value Ref Range Status   03/04/2024 3.8 3.4 - 5.3 mmol/L Final     Chloride   Date Value Ref Range Status   03/04/2024 104 98 - 107 mmol/L Final     Carbon Dioxide (CO2)   Date Value Ref Range Status   03/04/2024 24 22 - 29 mmol/L Final     Anion Gap   Date Value Ref Range Status   03/04/2024 13 7 - 15 mmol/L Final     Glucose   Date Value Ref Range Status   03/04/2024 90 70 - 99 mg/dL Final     Urea Nitrogen   Date Value Ref Range Status   03/04/2024 7.4 6.0 - 20.0 mg/dL Final   08/06/2006 10 5 - 24 mg/dL Final     Creatinine   Date Value Ref Range Status   03/04/2024 0.73 0.51 - 0.95 mg/dL Final   08/06/2006 0.67 0.60 - 1.30 mg/dL Final     GFR Estimate   Date Value Ref Range Status   03/04/2024 >90 >60 mL/min/1.73m2 Final     Calcium   Date Value Ref Range Status   03/04/2024 9.3 8.6 - 10.0 mg/dL Final     Bilirubin Total   Date Value Ref Range Status   03/04/2024 0.4 <=1.2 mg/dL Final     Alkaline Phosphatase   Date Value Ref Range Status   03/04/2024 100 40 - 150 U/L Final     Comment:     Reference intervals for this test were updated on 11/14/2023 to more accurately reflect our healthy population. There may be differences in the flagging of prior results with similar values performed with this method. Interpretation of those prior results can be made in the context of the updated reference intervals.     ALT   Date Value Ref Range Status   03/04/2024 11 0 - 50 U/L Final     Comment:     Reference intervals for this test were updated on 6/12/2023 to more accurately reflect our healthy population. There may be differences in the flagging of prior results with similar values performed with this method. Interpretation of those prior results can be made in the context of the updated reference  intervals.     08/06/2006 20 0 - 50 U/L Final     AST   Date Value Ref Range Status   03/04/2024 21 0 - 45 U/L Final     Comment:     Reference intervals for this test were updated on 6/12/2023 to more accurately reflect our healthy population. There may be differences in the flagging of prior results with similar values performed with this method. Interpretation of those prior results can be made in the context of the updated reference intervals.   08/06/2006 25 0 - 45 U/L Final               .      20 minutes spent by me on the date of the encounter doing chart review, history and exam, documentation and further activities per the note   Lars Morales MD  [facility]  8:31 AM  10/21/2024

## 2025-01-09 DIAGNOSIS — E66.01 CLASS 3 SEVERE OBESITY DUE TO EXCESS CALORIES WITH BODY MASS INDEX (BMI) OF 40.0 TO 44.9 IN ADULT, UNSPECIFIED WHETHER SERIOUS COMORBIDITY PRESENT (H): ICD-10-CM

## 2025-01-09 DIAGNOSIS — E66.813 CLASS 3 SEVERE OBESITY DUE TO EXCESS CALORIES WITH BODY MASS INDEX (BMI) OF 40.0 TO 44.9 IN ADULT, UNSPECIFIED WHETHER SERIOUS COMORBIDITY PRESENT (H): ICD-10-CM

## 2025-03-15 ENCOUNTER — HEALTH MAINTENANCE LETTER (OUTPATIENT)
Age: 45
End: 2025-03-15

## 2025-04-03 ENCOUNTER — VIRTUAL VISIT (OUTPATIENT)
Dept: SURGERY | Facility: CLINIC | Age: 45
End: 2025-04-03
Payer: COMMERCIAL

## 2025-04-03 VITALS — BODY MASS INDEX: 38.3 KG/M2 | HEIGHT: 67 IN | WEIGHT: 244 LBS

## 2025-04-03 DIAGNOSIS — Z86.39 HISTORY OF MORBID OBESITY: ICD-10-CM

## 2025-04-03 DIAGNOSIS — E66.813 CLASS 3 SEVERE OBESITY DUE TO EXCESS CALORIES WITH BODY MASS INDEX (BMI) OF 40.0 TO 44.9 IN ADULT, UNSPECIFIED WHETHER SERIOUS COMORBIDITY PRESENT (H): ICD-10-CM

## 2025-04-03 DIAGNOSIS — E66.09 CLASS 2 OBESITY DUE TO EXCESS CALORIES WITH BODY MASS INDEX (BMI) OF 38.0 TO 38.9 IN ADULT, UNSPECIFIED WHETHER SERIOUS COMORBIDITY PRESENT: Primary | ICD-10-CM

## 2025-04-03 DIAGNOSIS — E66.812 CLASS 2 OBESITY DUE TO EXCESS CALORIES WITH BODY MASS INDEX (BMI) OF 38.0 TO 38.9 IN ADULT, UNSPECIFIED WHETHER SERIOUS COMORBIDITY PRESENT: Primary | ICD-10-CM

## 2025-04-03 DIAGNOSIS — E66.01 CLASS 3 SEVERE OBESITY DUE TO EXCESS CALORIES WITH BODY MASS INDEX (BMI) OF 40.0 TO 44.9 IN ADULT, UNSPECIFIED WHETHER SERIOUS COMORBIDITY PRESENT (H): ICD-10-CM

## 2025-04-03 RX ORDER — PHENTERMINE HYDROCHLORIDE 37.5 MG/1
TABLET ORAL
Qty: 45 TABLET | Refills: 1 | Status: SHIPPED | OUTPATIENT
Start: 2025-04-03

## 2025-04-03 ASSESSMENT — PAIN SCALES - GENERAL: PAINLEVEL_OUTOF10: SEVERE PAIN (7)

## 2025-04-03 NOTE — NURSING NOTE
Current patient location:  [place] [facility] in car at parkin lot    Is the patient currently in the state of [place]? YES    Visit mode:VIDEO    If the visit is dropped, the patient can be reconnected by: VIDEO VISIT: Text to cell phone:   Telephone Information:   Mobile [phone]           Will anyone else be joining the visit? NO  (If patient encounters technical issues they should call [phone])    Are changes needed to the allergy or medication list? Pt stated no changes to allergies and Pt stated no med changes    Are refills needed on medications prescribed by this physician? YES phentermine and metformin    Reason for visit: RECHECK    Beverly DANIELS

## 2025-04-03 NOTE — LETTER
4/3/2025      Melissa Jay  [address]  [facility] [address]      Dear Colleague,    Thank you for referring your patient, Melissa Jay, to the [facility] [place]. Please see a copy of my visit note below.    Bariatric Clinic Follow-Up Visit:    Melissa Jay is a 44 year old  female with Body mass index is 38.21 kg/m .  presenting here today for follow-up on non-surgical efforts for weight loss. Original Intake visit occurred on 3/4/24 with a weight of 279lbs and BMI of 43.7.  Along with diet and behavior changes, she has been using Wegovy (Her Adderall for ADD has some appetite suppression effects as well) to assist her weight loss goals.  Her insurance requires Wegovy trial prior to considering Zepbound.  Wegovy was prescribed in April 2024 but she arrives at her 6/17/24 visit stating her pharmacy hasn't had any and weight is essentially neutral to slightly up (282 lbs).  Re-Ramping prescribed at that visit and up to 2.4mg/week dose as of our 10/21/24 visit.    Her insurance dropped coverage for Wegovy in 2025 and she transitioned to phentermine/metformin in January 2025.      See her intake visit notes for details on identified contributors to weight gain in the past. Chart review shows Dietician calculated RMR of 1950kcal/day and protein intake goal of 70-90g/day.     Weight:   Wt Readings from Last 5 Encounters:   04/03/25 110.7 kg (244 lb)   10/21/24 122.8 kg (270 lb 12.8 oz)   08/26/24 125.9 kg (277 lb 9.6 oz)   06/17/24 127.9 kg (282 lb)   03/04/24 126.6 kg (279 lb)    pounds    35 lbs down from introductory weight, a 12.5% total body weight reduction thus far.   Comorbidities:  Patient Active Problem List   Diagnosis     Obesity     RW ALLERGIES/IMMUNOLOGY (ABSTRACTED)     Mild intermittent asthma     Depressive disorder, not elsewhere classified     allergic rhinnitis     Backache     Myalgia and myositis     Narcolepsy without cataplexy(347.00)      Other specified aftercare following surgery     Routine postpartum follow-up     CARDIOVASCULAR SCREENING; LDL GOAL LESS THAN 160     Dyspnea on exertion     Dermatitis herpetiformis     IBS (irritable bowel syndrome)     Overweight     Uterine leiomyoma     Asthma     Idiopathic hypersomnia       Current Outpatient Medications:      betamethasone dipropionate (DIPROSONE) 0.05 % external cream, Apply topically every 24 hours, Disp: , Rfl:      celecoxib (CELEBREX) 100 MG capsule, , Disp: , Rfl:      metFORMIN (GLUCOPHAGE) 500 MG tablet, Start one tablet with supper for 2-3 weeks then increase if tolerating it to one tablet, twice daily with breakfast and supper., Disp: 180 tablet, Rfl: 0     omeprazole (PRILOSEC) 40 MG DR capsule, , Disp: , Rfl:      phentermine (ADIPEX-P) 37.5 MG tablet, Start half tablet daily after breakfast., Disp: 45 tablet, Rfl: 1     [employer]   [id number] [employer] Version 1.1    Question 3/29/2025 10:20 AM CDT - Filed by Patient   In general, would you say your health is: Fair   In general, would you say your quality of life is: Good   In general, how would you rate your physical health? Fair   In general, how would you rate your mental health, including your mood and your ability to think? Good   In general, how would you rate your satisfaction with your social activities and relationships? Fair   In general, please rate how well you carry out your usual social activities and roles. (This includes activities at home, at work and in your community, and responsibilities as a parent, child, spouse, employee, friend, etc.) Fair   To what extent are you able to carry out your everyday physical activities such as walking, climbing stairs, carrying groceries, or moving a chair? Mostly   In the past 7 days    How often have you been bothered by emotional problems such as feeling anxious, depressed or irritable? Never   How would you rate your fatigue on  average? Moderate   How would you rate your pain on average?   0 = No Pain  to  10 = Worst Imaginable Pain 7     [facility] Return Questionnaire    Question 3/29/2025 10:23 AM CDT - Filed by Patient   I have made the following changes to my diet since my last visit: Eating more calories   With regards to my diet, I am still struggling with: Making good choices/finding healthy food that doesn t hurt my stomach   I have made the following changes to my activity/exercise since my last visit: Failed at getting back to walking or recumbent biking every day   With regards to my activity/exercise, I am still struggling with: Feeling good enough to do it   Which weight loss medications are you currently taking on a regular basis? Phentermine    Metformin   If you are not taking a weight loss medication that was prescribed to you, please indicate why:    Are you having any side effects from the weight loss medication that we have prescribed you? No       Interim: Since our last visit, she has been using phentermine/metformin and finds it to be  tolerable but less efficacious. .   Getting protein first, cooked veggies and fruits regularly. Occ dumplings/starch/rice.     Plan:   1.  Diet: aiming for 1550-1625kcal/day if lightly active should nourish this next phase of weight loss well if getting 85-95 grams of lean protein daily. Hydrate well with water between meals to feel your best and hit 80-90 oz of fluids daily.    2. Exercise: aiming for 3-4 days weekly of 30 minutes minimum of walking/biking/chores that get your heart rate above 110 beats/minute.     3. Medication: continue phentermine/metformin. Half tablet phentermine no later than lunch (pre 1:30pm). Refills sent.    4. No labs needed today.    5. Goals: great work thus far with dropping 35 lbs, aiming to get BMI under 35 by the end of summer (currently at 38.2). steady progress, mindful meal prep/execution and bribing yourself along the way for success. Keep up  "the great work.       We discussed [facility] Bariatric Basics including:  -eating 3 meals daily  -reviewed metabolic needs for weight loss based on Resting Metabolic Rate  -protein goals supportive of healthy weight loss  -avoiding/limiting calorie containing beverages  -We discussed the importance of restorative sleep and stress management in maintaining a healthy weight.  -We discussed the National Weight Control Registry healthy weight maintenance strategies and ways to optimize metabolism.  -We discussed the importance of physical activity including cardiovascular and strength training in maintaining a healthier weight and explored viable options.      Most recent labs:  Lab Results   Component Value Date    WBC 5.4 03/04/2024    HGB 13.7 03/04/2024    HCT 40.4 03/04/2024    MCV 86 03/04/2024     03/04/2024     Lab Results   Component Value Date    CHOL 156 10/22/2007     Lab Results   Component Value Date    HDL 43 (L) 10/22/2007     No components found for: \"LDLCALC\"  No results found for: \"TRIG\"  No results found for: \"CHOLHDL\"  Lab Results   Component Value Date    ALT 11 03/04/2024    AST 21 03/04/2024    ALKPHOS 100 03/04/2024     No results found for: \"HGBA1C\"  Lab Results   Component Value Date    B12 447 03/04/2024     No components found for: \"VITDT1\"  No results found for: \"FELIX\"  Lab Results   Component Value Date    PTHI 38 03/04/2024     No results found for: \"ZN\"  No results found for: \"VIB1WB\"  Lab Results   Component Value Date    TSH 1.07 03/04/2024     No results found for: \"TEST\"    DIETARY HISTORY  See above. Reviewed minimizing simple starches from daily staples      PHYSICAL ACTIVITY PATTERNS:  Cardiovascular: biking/walking  Strength Training: limited     REVIEW OF SYSTEMS  Tolerates phentermine without insomnia/palpitations/moodiness or excessive dry mouth..  PHYSICAL EXAM:  Vitals: Ht 1.702 m (5' 7.01\")   Wt 110.7 kg (244 lb)   BMI 38.21 kg/m    Weight:   Wt Readings from Last " "3 Encounters:   04/03/25 110.7 kg (244 lb)   10/21/24 122.8 kg (270 lb 12.8 oz)   08/26/24 125.9 kg (277 lb 9.6 oz)         GEN: Pleasant, well groomed, in no acute distress  HEENT:  normal facies .  NECK: No swelling.  HEART: .  LUNGS: No respiratory difficulty noted. No cough. .  ABDOMEN: .  EXTREMITIES: No tremor. Seated in car..  NEURO: Alert and Oriented X3, fluent speech. .  SKIN: No visible rashes. .    Interim study results: Last Comprehensive Metabolic Panel:  Lab Results   Component Value Date     03/04/2024    POTASSIUM 3.8 03/04/2024    CHLORIDE 104 03/04/2024    CO2 24 03/04/2024    ANIONGAP 13 03/04/2024    GLC 90 03/04/2024    BUN 7.4 03/04/2024    CR 0.73 03/04/2024    GFRESTIMATED >90 03/04/2024    CHA 9.3 03/04/2024       Lab Results   Component Value Date    A1C 5.4 03/04/2024     No results for input(s): \"CHOL\", \"HDL\", \"LDL\", \"TRIG\", \"CHOLHDLRATIO\" in the last 56603 hours.  .      30 minutes spent by me on the date of the encounter doing chart review, history and exam, documentation and further activities per the note   Lars Morales MD  [facility]  12:16 PM  4/3/2025    Virtual Visit Details    Type of service:  Video Visit     Originating Location (pt. Location): Other parked car    Distant Location (provider location):  On-site  Platform used for Video Visit: [employer]          Again, thank you for allowing me to participate in the care of your patient.        Sincerely,        Lars Morales MD    Electronically signed"

## 2025-04-03 NOTE — PROGRESS NOTES
Virtual Visit Details    Type of service:  Video Visit     Originating Location (pt. Location): Other parked car    Distant Location (provider location):  On-site  Platform used for Video Visit: [employer]

## 2025-04-03 NOTE — PATIENT INSTRUCTIONS
35 lbs down from introductory weight of 279 lbs, a 12.5% total body weight reduction thus far.     Plan:   1.  Diet: aiming for 1550-1625kcal/day if lightly active should nourish this next phase of weight loss well if getting 85-95 grams of lean protein daily. Hydrate well with water between meals to feel your best and hit 80-90 oz of fluids daily.    2. Exercise: aiming for 3-4 days weekly of 30 minutes minimum of walking/biking/chores that get your heart rate above 110 beats/minute.     3. Medication: continue phentermine/metformin. Half tablet phentermine no later than lunch (pre 1:30pm). Refills sent.    4. No labs needed today.    5. Goals: great work thus far with dropping 35 lbs, aiming to get BMI under 35 by the end of summer (currently at 38.2). steady progress, mindful meal prep/execution and bribing yourself along the way for success. Keep up the great work.      On-the-Go Breakfast Ideas  As of 2015, the latest research shows what a huge impact eating breakfast has on losing weight and feeling your best. People lose more weight when they make breakfast their biggest meal of the day compared to Dinner, but even if you cannot go to that degree, getting a breakfast that has at least 20 grams of protein and even a moderate amount of fat is ideal for maintaining good energy through the day and limits overeating in the evening hours.  The following are some quick and easy suggestions for at least getting something of substance into your body in the morning.  Enjoy!    Eating breakfast within 90 minutes of waking up is an important part of taking care of your body on a restricted calorie diet plan.  After sleeping for hours, your body is in need of fuel.  An ideal breakfast is a combination of protein, whole-grain carbohydrates, or fruit.  Here s why:    -Protein digests very slowly in the body, helping you feel more satisfied.  -Whole grains provide dietary fiber, which also digests slowly and helps keep your  gut clean.  -Fruit is a great source of vitamins, minerals, and fiber.     Each one of these breakfast combinations has between 200-300 calories and 15-20 grams of protein.  Feel free to mix and match!    Bone Broth (chicken bone broth or beef bone broth) is a great way to boost protein content. 8oz of bone broth will typically have 9-12grams of protein for 40kcal of energy.    Protein: Choose  -1/2 cup low-fat cottage cheese  -2 hard boiled eggs , or one cooked in olive oil (low/slow heat).  -1 low fat string cheese stick  -1 Tablespoon natural peanut butter  -[employer] vegetarian sausage jessica (found in freezer section)  -1 slice lowfat cheese  -6 oz 2% or lowfat Greek yogurt, such as Fage or Oikos.    PLUS    Whole Grains:  Choose   -1 whole wheat English muffin  -1 whole wheat nitin, half  -1/2 Fiber One frozen muffin, thawed  -1/2 Fiber One toaster pastry  -1 whole wheat bagel thin  -1/2 cup Kashi cereal  -1 Kashi waffle (or other whole grain high-fiber waffle)  Aim for whole grain/sprouted breads with at least 3g of fiber/slice if having bread. [place] is one such brand.    OR    Fruit: Choose  -1/3 cup blueberries  -1/2 banana (or a plantain- similar to a banana, yet smaller)  -1/2 cup cantaloupe cubes  -1 small apple  -1 small orange  -1/2 cup strawberries  -handful raspberries/blackberries (each berry is about 1 calorie).    *Adapted from [employer], Fall 20    Ten Breakfasts Under 250 calories    Ideally, getting between 350-600 calories  (depending on starting height and weight)for breakfast is ideal for avoiding hunger later in the day, adjust/add to the following accordingly:    One- 250 calories, 8.5 g protein  1 slice whole-grain toast   1 Tbsp peanut butter    banana    Two- 250 calories, 8 g protein    cup nonfat/lowfat yogurt  1/3rd cup diced no-sugar peaches  1/3rd cup cereal (like Special K, Cheerios, or bran flakes)    Three- 250 calories, 25 g protein  1 egg scrambled with 1 oz  skim milk    cup shredded cheddar    whole grain English muffin  1 oz [place] muñoz  1 tsp margarine spread    Four- 225 calories, 25 g protein  1/2 cup Kashi Go-Lean cereal    cup skim milk mixed with 1 scoop Bariatric Advantage protein powder    cup no-sugar diced pears    Five- 250 calories, 20 g protein    cup oatmeal prepared with skim milk, 1 scoop protein powder, and sugar-free maple syrup    Six- 200 calories, 5 g protein  1 whole grain waffle, toasted  1 tablespoon creamy peanut or almond butter    Seven-  250 calories, 19 g protein  Breakfast sandwich: 1 slice whole grain toast, cut in half.  Add 1 scrambled egg and one slice cheddar  cheese.    Eight-  250 calories, 15 g protein  2 eggs scrambled with 1/3 cup frozen spinach (heat before adding to eggs) and 2 tablespoons low fat cream cheese.    Nine-  150 calories, 15 g protein  2/3rd cup cottage cheese    cup cantaloupe    Ten- 200 calories, 20 g protein  Fruit smoothie made with 4 oz. nonfat Greek yogurt,   cup berries, 1 scoop protein powder, and 4 oz skim milk.    Ten Lunches Under 250 Calories    Aim for lunch to be around 300-400 calories a day when trying to lose weight and get that protein in!    One- 200 calories, 11 g protein  1/3 cup tuna salad made with light conner on 1 slice whole grain bread  1 small peeled apple    Two- 250 calories, 16 g protein  1/3 cup lowfat cottage cheese    cup cooked green beans    small fruit cocktail (in natural juice)    Three- 200 calories, 11 g protein    grilled cheese sandwich on whole grain bread with lowfat cheese  2/3rd cup of tomato soup    Four- 250 calories, 22 g protein  Deli wrap: 1 oz sliced turkey, 1 oz sliced ham, 1 oz sliced chicken rolled up with 1 slice low-fat cheese  1 small orange    Five- 250 calories, 28 g protein  2/3rd cup chili with 1 oz shredded cheese  4 saltine crackers    Six- 250 calories, 22 g protein  1 cup fresh spinach with 2 oz chicken, 1/3rd cup mandarin oranges, and 2  tablespoons sliced almonds with 1 tablespoon  vinaigrette dressing    Seven- 200 calories, 11 g protein  1 Tbsp sugar-free preserves and 1 Tbsp peanut butter on 1 slice whole grain toast    cup nonfat/lowfat Greek yogurt    Eight- 250 calories, 18 g protein  1 small soft-shell chicken taco with 1 oz shredded cheese, lettuce, tomato, salsa, and 1 Tbsp light sour cream    cup black beans    Nine- 225 calories, 13 g protein  2 ounces baked chicken  1/4 cup mashed potatoes    cup green beans    Ten- 200 calories, 21 g protein  Deli nitin: 2 oz roast beef or other deli meat with 1 tsp [place] mayonnaise and sliced tomato, onion, and lettuce  1/3rd cup cottage cheese      Ten Dinners Under 300 calories    If you're eating a large breakfast and medium lunch, keep dinner small.  300-400 calories is ideal for most people depending on their caloric needs.    One- 300 calories, 12 g protein  1-inch thick slice of turkey meatloaf    cup baked butternut squash    Two- 200 calories, 9 g protein  Bread-less BLT: 3 slices turkey muñoz, sliced tomato, wrapped in a large lettuce leaf    cup peeled fruit    Three- 275 calories, 36 g protein  3 oz roasted chicken    cup cooked broccoli    cup shredded cheddar cheese    cup unsweetened applesauce    Four- 200 calories, 25 g protein  3 oz baked tilapia  1/3rd cup cooked carrots    cup yogurt    Five- 250 calories, 20 g protein  Grilled ham  n  Swiss: spread 2 tsp ghee or butter on 1 slice of whole grain bread.  Cut bread in half, layer 2 oz deli ham with 1 piece of Swiss cheese and grill until cheese is melted.    cup cooked vegetables    Six- 250 calories, 18 g protein  Vegetarian cheeseburger: 1 [place] cheeseburger topped with lettuce, onion, tomato, and ketchup/mustard    cup sweet potato fries    Seven- 250 calories, 18 g protein  Pork pot roast: 2 oz roasted pork loin, 1/3rd cup roasted carrots,   medium potato, cooked with   cup gravy    Eight- 330 calories, 25 g protein  2 oz meatballs  (about 2 small meatballs)    cup spaghetti sauce  1/2 piece toast topped with 1 tsp ghee or butterand topped with garlic powder, toasted in oven    Nine- 250 calories, 16 g protein  Mexican pizza: one 8  corn tortilla topped with 2 oz chicken,   cup salsa, 2 tablespoons black beans, 2 tablespoons shredded cheese.  Bake until cheese is melted.    Ten- 250 calories, 22 g protein  Shrimp stir-razo: 3 oz cooked shrimp, 1/6th onion,   pepper,   cup chopped carrots sautéed in 1 tablespoon olive oil, topped with 2 tablespoons stir razo sauce and a pinch of sesame seeds        150 Calories or Less Snack Ideas   1 hardboiled egg with   cup berries  1 small apple with 1 hardboiled egg  10 almonds with   cup berries  2 clementines with 1 light string cheese  1 light string cheese with   sliced apple  1 light string cheese wrapped in 2 slices of turkey  4 100% whole wheat crackers (e.g. Triscuit) with 1 light string cheese    c. cottage cheese with   cup fruit and 1 Tbsp sunflower seeds     cup cottage cheese with   of an avocado     can tuna fish with 1 cup sliced cucumbers     cup roasted garbanzo beans with paprika and cayenne pepper    baked sweet potato with   cup chili beans or   cup cottage cheese  2 oz. nitrate free turkey slices with 1 cup carrots  1 container (6 oz) of low sugar (less than 10 grams of sugar) greek yogurt   3 Tablespoons of hummus with 1 cup sliced bell peppers   2 Tablespoons of hummus with 15 baby carrots  4 Tablespoons ranch dip made with plain Greek Yogurt and 3 mini cucumbers  1/4 cup nuts (any kind)  1 Tablespoon peanut butter with 1 stalk celery   1 dill pickle wrapped in 1-2 slices of deli ham with 1 tsp of mayonnaise/mustard.    LEAN PROTEIN SOURCES  Getting 20-30 grams of protein, 3 meals daily, is appropriate for most people, some need more but more than about 40 grams per meal is not useful.  General rule is drinking one ounce of water per gram of protein eaten over the course of the day:  70  grams of protein each day, drink 70 oz of water.  Protein Source Portion Calories Grams of Protein                           Nonfat, plain Greek yogurt    (10 grams sugar or less) 3/4 cup (6 oz)  12-17   Light Yogurt (10 grams sugar or less) 3/4 cup (6 oz)  6-8   Protein Shake 1 shake 110-180 15-30   Skim/1% Milk or lactose-free milk 1 cup ( 8 oz)  8   Plain or light, flavored soymilk 1 cup  7-8   Plain or light, hemp milk 1 cup 110 6   Fat Free or 1% Cottage Cheese 1/2 cup 90 15   Part skim ricotta cheese 1/2 cup 100 14   Part skim or reduced fat cheese slices 1 ounce 65-80 8     Mozzarella String Cheese 1 80 8   Canned tuna, chicken, crab or salmon  (canned in water)  1/2 cup 100 15-20   White fish (broiled, grilled, baked) 3 ounces 100 21   [place]/Tuna (broiled, grilled, baked) 3 ounces 150-180 21   Shrimp, Scallops, Lobster, Crab 3 ounces 100 21   Pork loin, Pork Tenderloin 3 ounces 150 21   Boneless, skinless chicken /turkey breast                          (broiled, grilled, baked) 3 ounces 120 21   Pony, [place], [place], and Venison 3 ounces 120 21   Lean cuts of red meat and pork (sirloin,   round, tenderloin, flank, ground 93%-96%) 3 ounces 170 21   Lean or Extra Lean Ground Turkey 1/2 cup 150 20   90-95% Lean [place] Burger 1 jessica 140-180 21   Low-fat casserole with lean meat 3/4 cup 200 17   Luncheon Meats                                                        (turkey, lean ham, roast beef, chicken) 3 ounces 100 21   Egg (boiled, poached, scrambled) 1 Egg 60 7   Egg Substitute 1/2 cup 70 10   Nuts (limit to 1 serving per day)  3 Tbsp. 150 7   Nut [place] (peanut, almond)  Limit to 1 serving or less daily 1 Tbsp. 90 4   Soy Burger (varies) 1  15   Garbanzo, Black, Ivory Beans 1/2 cup 110 7   Refried Beans 1/2 cup 100 7   Kidney and Lima beans 1/2 cup 110 7   Tempeh 3 oz 175 18   Vegan crumbles 1/2 cup 100 14   Tofu 1/2 cup 110 14   Chili (beans and extra lean beef or turkey) 1  cup 200 23   Lentil Stew/Soup 1 cup 150 12   Black Bean Soup 1 cup 175 12       Example Meal Plan for a 8505-8311 Calorie Diet:    In order to fuel your weight loss properly and avoid hunger-induced overeating later in the day, for your height and weight, you will enjoy the most success by following the diet below or similar with adjustments based on your particular tastes and preferences.  Exercise may influence speed, amount of weight loss further.     I recommend getting into a meal routine and keeping it similar day to day in the beginning so you don t have to think too hard about what you re going to make/eat.  Keep snacks healthy, ideally containing protein and some vegetables.  Non-processed food is preferable to packaged items.  Eat at least a few crunchy green vegetables if having a snack, which should be 2-3 hours after your mealtimes(prepare these ahead of time for ease of use).  Drink 64 oz -80 oz of water daily for most, some of you will need more and we'll discuss it at your visit if that is the case.      When changing our diet,  we can often mistake thirst for hunger or just have some distracted eating habits that we need to break free from ('bored/mindless eating', screen time,work, driving,etc).  A glass of water and reconsideration of our hunger is often all that is needed.  Having the urge is not the problem, but watching it pass by without acting on it is the goal.    If you re having hunger problems, add a protein drink/snack to your morning hours or afternoon snack with at least 20grams of protein and not too much sugar (under 10g).  A carton of higher protein/low sugar yogurt can work as well.  If the urge to snack is overwhelming and not satiated, try going for a 10 minute walk/exercise, come home and drink a glass of water and if still hungry, have a  calorie snack (handful of raw/sprouted nuts, veggies and string cheese, protein bar, etc).  Savor it.    It is better to have a large  breakfast, a moderate lunch and a smaller dinner to fuel your day.  People lose 10-15% more weight during their weight loss season with this strategy. Optimizing your protein intake at each meal will further keep you more satisfied while eating less food overall.  Getting exercise in early has also been shown to offer the best results (before breakfast ideally but anytime is the right time to exercise if that is not an option for you).    To make sure you re getting adequate vitamins and minerals during weight loss, I recommend one complete multivitamin a day of your choice.  Consider a probiotic and taking some vitamin D 2000 IU daily.    Let supper be your last meal of the day and ideally try to have at least 12 hours between supper and breakfast the next day to tap into some beneficial overnight fasting dynamics.  Midnight snacks need to go away. Water in the evening is fine, unsweetened, non caffeinated herbal tea is helpful as well.  Consolidating your meals within a 8-12 hour period of your day will help tap into these additional metabolic benefits and tends to keep your appetite up for breakfast, further helping to stay on track.  For most of my patients, I don't recommend an intermittent fasting style diet (many find it hard to fit in their lifestyle) but an overnight fast is very doable for most patients and helps regulate our hunger drives a little better.  This makes it very important to nail good intake at all three meals to feel satisfied/energized and still lose weight.      If evening snacking desires are high, consider a glass of fiber supplement for some additional fullness (metamucil or similar). Most of us don't get the 25-30 grams per day of fiber that promotes good gut health/satiety.  Benefiber, metamucil, citrucel are reasonable/affordable options for most people.  Inulin, chicory, psyllium husk are reasonable options but start slow and low in the dose to avoid gas/bloating until your gut gets  acclimated (ramping up to 5-10 grams per day of supplemental fiber after 3-4 weeks if needed).      Example Meal Plan:  Breakfast: 450-475 Calories  1 egg cooked on low in olive oil:   calories.  5oz Greek Yogurt (Fage plain classic: ~150 eris)  Handful of Berries of your choice (about a calorie per berry or 20-40cal per handful)    cup(cooked) of  old fashioned oatmeal or 1/2 cup(cooked) steel cut oats. (150 eris)  Sprinkle amount of brown sugar and a pat of butter. (40 eris)  Glass of  Water  Black coffee or unsweetened Tea (0calories).      2-3 hours Later Snack: (195 calories).  Glass of water  One string Cheese (80 calories) or 4 oz creamed cottage cheese (115 calories) with  Crunchy Celery sticks (less than 10 calories per large stalk) 2 stalks. (20 calories)    of a  Large Banana or   of a Large Apple (60 calories):  eat second half at lunch or afternoon snack.     Lunch:300 -350 calories   Chicken Breast  (baked/broiled/roasted/grilled)  4-6 oz.  (125-180 eris), BBQ sauce/hot sauce/mustard/seasoning is free. Just use a reasonable amount. Or a can of tuna with 1 tablespoon mayonnaise.  Salad: lettuce, any other veggies (cucumbers, green peppers/celery you like and a small drizzle of dressing to just flavor.  Go as big on the veggies as you like,  as they are practically calorie free.   A whole, 8 inch cucumber is 45 calories, a whole green pepper is 23 calories, a stalk of celery is 9 calories.  [place] Dressing is 60 calories per tablespoon..so moderate your desired dressing or do a drizzle of olive oil and splash of balsamic vinegar on top,  Total calories unlikely to be over 150 even with dressing.  Glass of Water.    Option for lunch is meal replacement protein drink/smoothie.  Need at least 20 grams of protein and eat the rest of your apple/banana from the morning snack.      Afternoon Snack: 150-200 calories   Cheese Stick or cottage cheese again  and a fresh fruit OR  Granola Bar (protein Bar  acceptable if under 200 calories OR  Homemade smoothies:  8oz skim milk,  a handful of berries (fresh or frozen and a serving of protein powder such as BiPro or Ginny sWhey for example.  If you don't like dairy, make with 8oz water, one small banana, handful of berries and the protein powder, add any veggies you want as well:  roughly 200 calories.   Glass of Water    Dinner: 325 calories  4oz of fresh, [place] salmon.  Broiled (salt/pepper/dill) for about 8-8.5 minutes (200calories) or  4oz filet mignon steak or sirloin steak  Salad or vegetable sautéed lightly in olive oil or   Broccoli 1.5  cups chopped and steamed  or micro-waved in a little water (75 calories)  Glass of Water,    Cup of herbal tea (unsweetened, caffeine free)      Herbs and seasonings are encouraged to flavor your foods/vegetables.  Make your food delicious.      Tips for Success:  1.  Prepare proteins ahead of time (broil chicken breasts in bulk so you can grab and go), steel cut oats/lentils can be stored in casserole dish/bowl in the fridge for quick scoop in the morning and rewarm in microwave, make use of crock pot recipes (watch salt content).  Making meals that cover 3-4 future meals is an easy way to stay on track.  2.  Drink a 8-12 oz glass of water every 2-3 hours when awake.  We often mistake hunger for thirst, especially when losing weight.  3. Remember your Reward and Motivation when things get hard.  4.  Weigh yourself every morning and record, you'll stay on track better and learn how our biorhythms, diet and elimination patterns show up on the scale. Don't worry about 1 or 2 day patterns, but when on track you'll notice good trend downward of weight over 3-4 day segments.  Plateaus tend to resolve after 4-8 days in most cases if you stay consistent with your plan.  These are natural and part of weight loss, even if you're perfect with your plan execution.  5. Call if problems/concerns.  [employer] is a great tool to stay in touch  "and provide weekly outside accountability. Check in with questions or if you want to brag.  6.  Find a handful of meals/foods that keep you on track and feeling good and get into a routine that is sustainable for you.  It's OK to have a routine that works for you.  7.  Consider taking a complete multivitamin just to make sure all micronutrients are adequate during weight loss.  8. If losing hair/brittle nails it usually means you are not taking enough protein.  Minimum goal is 60 grams daily of protein for smaller women, 80 grams a day for men. Consider taking Biotin as supplement or a \"Hair and Nail\" multivitamin.    Information about the Weight Loss Medication Phentermine    When combined with mindful eating and behavior changes, weight loss medications can be a nice additional tool to maximize your weight loss season.  There are no magic pills and without diet and behavior changes, weight loss will be minimal.  Think of this medication as a tool to make your diet and behavior changes easier and you'll enjoy a higher probability of success.  Remember not to skip meals, but use this medication to tolerate your reduced calories more easily.  If you are very hungry in the evenings, you are likely not eating enough in the first 10 hours of your day and need to focus on getting your protein requirements in at each of your 3 daily meals.      Phentermine is a stimulant medication related to the amphetamine class of medication but with a lower risk of dependence and addiction.  It is used for weight loss by suppressing the appetite region of the brain.  It also may speed up the metabolic rate and give a person more energy.  Like any medication there are potential side effects and the most common are:  Dry mouth occurs in almost everyone (hydrate well), fewer people experience Palpatations, fast heart rate, elevation of blood pressure, restlessness, insomnia, dizziness, change in mood, tremor, headache, changes in bowel " "movements,itchiness, changes in sex drive.  If you are or may have become pregnant, do not use phentermine as it increases the risk for birth defects/miscarriage.  Do not use if breastfeeding.    Some people can develop serious side effects which include:  Heart strain (\"ischemia\").  Tachycardia (fast heart rate or irregular heart rate).  Hypertension  Pulmonary Hypertension  Psychosis  Dependency and abuse has occurred in some.  If you've been on high dose (37.5mg) for long periods, phentermine should be tapered down over a few weeks before abruptly stopping as seizures have been reported rarely.    We do not recommend taking it in combination with the following medications due to potential drug interactions which can increase the risk of side effects and/or potential for seizures:    Absolutely contraindicated are:  Amphetamines or other stimulants like ADHD medication: (dextroamphetamine, amphetamine, diethylpropion, isocarboxazid, methamphetamine, lisdexamfetamine, benzphetamine,dexmethylphenidate, methylphenidate, selegiline patch, sibutramine, tranylcypromine.    Avoid use with:   Dopamine, dobutamine, ephedra, ephedrine, epinephrine, isoproterenol, linezolid, norepinephrine, phenylephrine injection, venlafaxine (Effexor).    Monitor or modify dose with:  Acebutolol, atenolol, betaxolol, bisoprolol, carvedilol, droxidopa, esmolol, labetalol, magnesium citrate, metoprolol, nadolol, nebivolol, penbutolol, pindolol, propranolol, sotalol, timolol.    Caution with: armodafinil,betaxolol eye drops, brexpiprazole, bupropion, busulfan, caffeine, carteolol drops, enzalutaminde, ginseng, green tea, guarana, levobunolol drops, lindane cream, modafinil, afrin nasal spray (oxymetazoline), pamabrom, phenylephrine oral and nasal spray, pseudoephedrine (sudafed), rasgiline, sleegiline, bowel prep, tiagabine, timolol drops,  TRAMADOL due to increased risk of seizures.    The current cheapest place to fill your prescription is " "at [facility], [facility], [facility] or [place] pharmacy,[facility] or [facility] and is around $22for 90 tablets.  Occasionally, [facility] and [facility] have price matched, so call around and get the best price for you.  Other pharmacies may charge closer to$70- $100 for the same prescription. You don't have to be a member to use the pharmacy at [facility] currently.  An alternative some patients have tried is using a voucher system through [employer].  $25 paid on their website gets you a  voucher that can allows you to pick your meds up at [facility] without paying anything more.  [employer] may also offer discounted coupons and give prices around you.    Dosing:  We start with half a tablet for the first 2-3 weeks and if tolerating it without problems, you can take up to one full tablet daily in the morning after breakfast.  For those with evening hunger problems, sometimes half a tablet in the morning and half a tablet around 1 pm can be effective, however, risks of nighttime insomnia/restless increase with afternoon dosing so call me at the clinic if considering this regimen or having any issues.  You only have to use the amount effective for you, not to exceed one full tablet.  It can also be used situationaly and does not have to be taken every day. For more sensitive individuals,: get a pill cutter and cut the half tab into quarters and use a quarter of a tablet, about 9mg, for a couple weeks before increasing to half a tablet (18.75mg) if needed.  As always, if any questions give us a call at the [facility] telephone:  [phone].     Don't use Phentermine if sick/ill or using other stimulants/cold medications and it's OK to skip days that you don't feel the need for appetite suppressant assistance.  This medication works the day you take it and doesn't require \"building up\" in the system.    "

## 2025-04-03 NOTE — PROGRESS NOTES
Bariatric Clinic Follow-Up Visit:    Melissa Jay is a 44 year old  female with Body mass index is 38.21 kg/m .  presenting here today for follow-up on non-surgical efforts for weight loss. Original Intake visit occurred on 3/4/24 with a weight of 279lbs and BMI of 43.7.  Along with diet and behavior changes, she has been using Wegovy (Her Adderall for ADD has some appetite suppression effects as well) to assist her weight loss goals.  Her insurance requires Wegovy trial prior to considering Zepbound.  Wegovy was prescribed in April 2024 but she arrives at her 6/17/24 visit stating her pharmacy hasn't had any and weight is essentially neutral to slightly up (282 lbs).  Re-Ramping prescribed at that visit and up to 2.4mg/week dose as of our 10/21/24 visit.    Her insurance dropped coverage for Wegovy in 2025 and she transitioned to phentermine/metformin in January 2025.      See her intake visit notes for details on identified contributors to weight gain in the past. Chart review shows Dietician calculated RMR of 1950kcal/day and protein intake goal of 70-90g/day.     Weight:   Wt Readings from Last 5 Encounters:   04/03/25 110.7 kg (244 lb)   10/21/24 122.8 kg (270 lb 12.8 oz)   08/26/24 125.9 kg (277 lb 9.6 oz)   06/17/24 127.9 kg (282 lb)   03/04/24 126.6 kg (279 lb)    pounds    35 lbs down from introductory weight, a 12.5% total body weight reduction thus far.   Comorbidities:  Patient Active Problem List   Diagnosis    Obesity    RW ALLERGIES/IMMUNOLOGY (ABSTRACTED)    Mild intermittent asthma    Depressive disorder, not elsewhere classified    allergic rhinnitis    Backache    Myalgia and myositis    Narcolepsy without cataplexy(347.00)    Other specified aftercare following surgery    Routine postpartum follow-up    CARDIOVASCULAR SCREENING; LDL GOAL LESS THAN 160    Dyspnea on exertion    Dermatitis herpetiformis    IBS (irritable bowel syndrome)    Overweight    Uterine leiomyoma    Asthma    Idiopathic  hypersomnia       Current Outpatient Medications:     betamethasone dipropionate (DIPROSONE) 0.05 % external cream, Apply topically every 24 hours, Disp: , Rfl:     celecoxib (CELEBREX) 100 MG capsule, , Disp: , Rfl:     metFORMIN (GLUCOPHAGE) 500 MG tablet, Start one tablet with supper for 2-3 weeks then increase if tolerating it to one tablet, twice daily with breakfast and supper., Disp: 180 tablet, Rfl: 0    omeprazole (PRILOSEC) 40 MG DR capsule, , Disp: , Rfl:     phentermine (ADIPEX-P) 37.5 MG tablet, Start half tablet daily after breakfast., Disp: 45 tablet, Rfl: 1     [employer]   [id number] [employer] Version 1.1    Question 3/29/2025 10:20 AM CDT - Filed by Patient   In general, would you say your health is: Fair   In general, would you say your quality of life is: Good   In general, how would you rate your physical health? Fair   In general, how would you rate your mental health, including your mood and your ability to think? Good   In general, how would you rate your satisfaction with your social activities and relationships? Fair   In general, please rate how well you carry out your usual social activities and roles. (This includes activities at home, at work and in your community, and responsibilities as a parent, child, spouse, employee, friend, etc.) Fair   To what extent are you able to carry out your everyday physical activities such as walking, climbing stairs, carrying groceries, or moving a chair? Mostly   In the past 7 days    How often have you been bothered by emotional problems such as feeling anxious, depressed or irritable? Never   How would you rate your fatigue on average? Moderate   How would you rate your pain on average?   0 = No Pain  to  10 = Worst Imaginable Pain 7     [facility] Return Questionnaire    Question 3/29/2025 10:23 AM CDT - Filed by Patient   I have made the following changes to my diet since my last visit: Eating more  calories   With regards to my diet, I am still struggling with: Making good choices/finding healthy food that doesn t hurt my stomach   I have made the following changes to my activity/exercise since my last visit: Failed at getting back to walking or recumbent biking every day   With regards to my activity/exercise, I am still struggling with: Feeling good enough to do it   Which weight loss medications are you currently taking on a regular basis? Phentermine    Metformin   If you are not taking a weight loss medication that was prescribed to you, please indicate why:    Are you having any side effects from the weight loss medication that we have prescribed you? No       Interim: Since our last visit, she has been using phentermine/metformin and finds it to be  tolerable but less efficacious. .   Getting protein first, cooked veggies and fruits regularly. Occ dumplings/starch/rice.     Plan:   1.  Diet: aiming for 1550-1625kcal/day if lightly active should nourish this next phase of weight loss well if getting 85-95 grams of lean protein daily. Hydrate well with water between meals to feel your best and hit 80-90 oz of fluids daily.    2. Exercise: aiming for 3-4 days weekly of 30 minutes minimum of walking/biking/chores that get your heart rate above 110 beats/minute.     3. Medication: continue phentermine/metformin. Half tablet phentermine no later than lunch (pre 1:30pm). Refills sent.    4. No labs needed today.    5. Goals: great work thus far with dropping 35 lbs, aiming to get BMI under 35 by the end of summer (currently at 38.2). steady progress, mindful meal prep/execution and bribing yourself along the way for success. Keep up the great work.       We discussed [facility] including:  -eating 3 meals daily  -reviewed metabolic needs for weight loss based on Resting Metabolic Rate  -protein goals supportive of healthy weight loss  -avoiding/limiting calorie containing beverages  -We  "discussed the importance of restorative sleep and stress management in maintaining a healthy weight.  -We discussed the National Weight Control Registry healthy weight maintenance strategies and ways to optimize metabolism.  -We discussed the importance of physical activity including cardiovascular and strength training in maintaining a healthier weight and explored viable options.      Most recent labs:  Lab Results   Component Value Date    WBC 5.4 03/04/2024    HGB 13.7 03/04/2024    HCT 40.4 03/04/2024    MCV 86 03/04/2024     03/04/2024     Lab Results   Component Value Date    CHOL 156 10/22/2007     Lab Results   Component Value Date    HDL 43 (L) 10/22/2007     No components found for: \"LDLCALC\"  No results found for: \"TRIG\"  No results found for: \"CHOLHDL\"  Lab Results   Component Value Date    ALT 11 03/04/2024    AST 21 03/04/2024    ALKPHOS 100 03/04/2024     No results found for: \"HGBA1C\"  Lab Results   Component Value Date    B12 447 03/04/2024     No components found for: \"VITDT1\"  No results found for: \"FELIX\"  Lab Results   Component Value Date    PTHI 38 03/04/2024     No results found for: \"ZN\"  No results found for: \"VIB1WB\"  Lab Results   Component Value Date    TSH 1.07 03/04/2024     No results found for: \"TEST\"    DIETARY HISTORY  See above. Reviewed minimizing simple starches from daily staples      PHYSICAL ACTIVITY PATTERNS:  Cardiovascular: biking/walking  Strength Training: limited     REVIEW OF SYSTEMS  Tolerates phentermine without insomnia/palpitations/moodiness or excessive dry mouth..  PHYSICAL EXAM:  Vitals: Ht 1.702 m (5' 7.01\")   Wt 110.7 kg (244 lb)   BMI 38.21 kg/m    Weight:   Wt Readings from Last 3 Encounters:   04/03/25 110.7 kg (244 lb)   10/21/24 122.8 kg (270 lb 12.8 oz)   08/26/24 125.9 kg (277 lb 9.6 oz)         GEN: Pleasant, well groomed, in no acute distress  HEENT:  normal facies .  NECK: No swelling.  HEART: .  LUNGS: No respiratory difficulty noted. No " "cough. .  ABDOMEN: .  EXTREMITIES: No tremor. Seated in car..  NEURO: Alert and Oriented X3, fluent speech. .  SKIN: No visible rashes. .    Interim study results: Last Comprehensive Metabolic Panel:  Lab Results   Component Value Date     03/04/2024    POTASSIUM 3.8 03/04/2024    CHLORIDE 104 03/04/2024    CO2 24 03/04/2024    ANIONGAP 13 03/04/2024    GLC 90 03/04/2024    BUN 7.4 03/04/2024    CR 0.73 03/04/2024    GFRESTIMATED >90 03/04/2024    CHA 9.3 03/04/2024       Lab Results   Component Value Date    A1C 5.4 03/04/2024     No results for input(s): \"CHOL\", \"HDL\", \"LDL\", \"TRIG\", \"CHOLHDLRATIO\" in the last 27517 hours.  .      30 minutes spent by me on the date of the encounter doing chart review, history and exam, documentation and further activities per the note   Lars Morales MD  [facility]  12:16 PM  4/3/2025  "

## 2025-07-08 ENCOUNTER — MYC REFILL (OUTPATIENT)
Dept: SURGERY | Facility: CLINIC | Age: 45
End: 2025-07-08
Payer: COMMERCIAL

## 2025-07-08 DIAGNOSIS — E66.812 CLASS 2 OBESITY DUE TO EXCESS CALORIES WITH BODY MASS INDEX (BMI) OF 38.0 TO 38.9 IN ADULT, UNSPECIFIED WHETHER SERIOUS COMORBIDITY PRESENT: ICD-10-CM

## 2025-07-08 DIAGNOSIS — E66.09 CLASS 2 OBESITY DUE TO EXCESS CALORIES WITH BODY MASS INDEX (BMI) OF 38.0 TO 38.9 IN ADULT, UNSPECIFIED WHETHER SERIOUS COMORBIDITY PRESENT: ICD-10-CM

## 2025-07-08 DIAGNOSIS — Z86.39 HISTORY OF MORBID OBESITY: ICD-10-CM
